# Patient Record
Sex: MALE | Race: WHITE | Employment: OTHER | URBAN - NONMETROPOLITAN AREA
[De-identification: names, ages, dates, MRNs, and addresses within clinical notes are randomized per-mention and may not be internally consistent; named-entity substitution may affect disease eponyms.]

---

## 2017-05-01 RX ORDER — TRAMADOL HYDROCHLORIDE 50 MG/1
50 TABLET ORAL EVERY 6 HOURS PRN
COMMUNITY
End: 2022-11-01

## 2017-05-05 ENCOUNTER — INITIAL CONSULT (OUTPATIENT)
Dept: SURGERY | Age: 82
End: 2017-05-05
Payer: MEDICARE

## 2017-05-05 VITALS
TEMPERATURE: 98.2 F | HEIGHT: 75 IN | HEART RATE: 80 BPM | DIASTOLIC BLOOD PRESSURE: 68 MMHG | WEIGHT: 190 LBS | BODY MASS INDEX: 23.62 KG/M2 | SYSTOLIC BLOOD PRESSURE: 128 MMHG

## 2017-05-05 DIAGNOSIS — K40.90 LEFT INGUINAL HERNIA: Primary | ICD-10-CM

## 2017-05-05 PROCEDURE — 1123F ACP DISCUSS/DSCN MKR DOCD: CPT | Performed by: SURGERY

## 2017-05-05 PROCEDURE — G8420 CALC BMI NORM PARAMETERS: HCPCS | Performed by: SURGERY

## 2017-05-05 PROCEDURE — 4004F PT TOBACCO SCREEN RCVD TLK: CPT | Performed by: SURGERY

## 2017-05-05 PROCEDURE — 99214 OFFICE O/P EST MOD 30 MIN: CPT | Performed by: SURGERY

## 2017-05-05 PROCEDURE — G8427 DOCREV CUR MEDS BY ELIG CLIN: HCPCS | Performed by: SURGERY

## 2017-05-05 PROCEDURE — 4040F PNEUMOC VAC/ADMIN/RCVD: CPT | Performed by: SURGERY

## 2017-07-18 ENCOUNTER — OFFICE VISIT (OUTPATIENT)
Dept: NEUROLOGY | Age: 82
End: 2017-07-18
Payer: MEDICARE

## 2017-07-18 VITALS
HEART RATE: 60 BPM | WEIGHT: 190.2 LBS | DIASTOLIC BLOOD PRESSURE: 58 MMHG | SYSTOLIC BLOOD PRESSURE: 122 MMHG | HEIGHT: 74 IN | BODY MASS INDEX: 24.41 KG/M2

## 2017-07-18 DIAGNOSIS — R26.89 BALANCE PROBLEM: ICD-10-CM

## 2017-07-18 DIAGNOSIS — G31.9 CEREBRAL ATROPHY (HCC): ICD-10-CM

## 2017-07-18 DIAGNOSIS — I67.82 CHRONIC CEREBRAL ISCHEMIA: ICD-10-CM

## 2017-07-18 DIAGNOSIS — I10 ESSENTIAL HYPERTENSION: ICD-10-CM

## 2017-07-18 DIAGNOSIS — G45.1 TIA INVOLVING LEFT INTERNAL CAROTID ARTERY: Primary | ICD-10-CM

## 2017-07-18 DIAGNOSIS — F03.90 DEMENTIA WITHOUT BEHAVIORAL DISTURBANCE, UNSPECIFIED DEMENTIA TYPE: ICD-10-CM

## 2017-07-18 DIAGNOSIS — N40.0 BENIGN PROSTATIC HYPERPLASIA WITHOUT LOWER URINARY TRACT SYMPTOMS, UNSPECIFIED MORPHOLOGY: ICD-10-CM

## 2017-07-18 PROCEDURE — 1123F ACP DISCUSS/DSCN MKR DOCD: CPT | Performed by: PSYCHIATRY & NEUROLOGY

## 2017-07-18 PROCEDURE — G8427 DOCREV CUR MEDS BY ELIG CLIN: HCPCS | Performed by: PSYCHIATRY & NEUROLOGY

## 2017-07-18 PROCEDURE — 4040F PNEUMOC VAC/ADMIN/RCVD: CPT | Performed by: PSYCHIATRY & NEUROLOGY

## 2017-07-18 PROCEDURE — 99215 OFFICE O/P EST HI 40 MIN: CPT | Performed by: PSYCHIATRY & NEUROLOGY

## 2017-07-18 PROCEDURE — G8420 CALC BMI NORM PARAMETERS: HCPCS | Performed by: PSYCHIATRY & NEUROLOGY

## 2017-07-18 PROCEDURE — 4004F PT TOBACCO SCREEN RCVD TLK: CPT | Performed by: PSYCHIATRY & NEUROLOGY

## 2017-07-18 ASSESSMENT — ENCOUNTER SYMPTOMS
EYE DISCHARGE: 0
PHOTOPHOBIA: 0
TROUBLE SWALLOWING: 0
VOICE CHANGE: 0
COLOR CHANGE: 0
EYE REDNESS: 0
WHEEZING: 0
APNEA: 0
DIARRHEA: 0
ABDOMINAL DISTENTION: 0
CHANGE IN BOWEL HABIT: 0
BLOOD IN STOOL: 0
SORE THROAT: 0
SWOLLEN GLANDS: 0
SHORTNESS OF BREATH: 0
BACK PAIN: 0
EYE PAIN: 0
ABDOMINAL PAIN: 0
EYE ITCHING: 0
CONSTIPATION: 0
VISUAL CHANGE: 0
FACIAL SWELLING: 0
SINUS PRESSURE: 0
CHEST TIGHTNESS: 0
COUGH: 0
NAUSEA: 0
VOMITING: 0
CHOKING: 0

## 2018-02-08 ENCOUNTER — OFFICE VISIT (OUTPATIENT)
Dept: NEUROLOGY | Age: 83
End: 2018-02-08
Payer: MEDICARE

## 2018-02-08 VITALS
HEART RATE: 61 BPM | HEIGHT: 74 IN | SYSTOLIC BLOOD PRESSURE: 126 MMHG | BODY MASS INDEX: 23.79 KG/M2 | WEIGHT: 185.4 LBS | DIASTOLIC BLOOD PRESSURE: 64 MMHG | OXYGEN SATURATION: 96 %

## 2018-02-08 DIAGNOSIS — G93.89 OTHER SPECIFIED DISORDERS OF BRAIN (CODE): ICD-10-CM

## 2018-02-08 DIAGNOSIS — G31.9 CEREBRAL ATROPHY (HCC): ICD-10-CM

## 2018-02-08 DIAGNOSIS — G45.1 TIA INVOLVING LEFT INTERNAL CAROTID ARTERY: ICD-10-CM

## 2018-02-08 DIAGNOSIS — I67.82 CHRONIC CEREBRAL ISCHEMIA: ICD-10-CM

## 2018-02-08 DIAGNOSIS — F03.90 DEMENTIA WITHOUT BEHAVIORAL DISTURBANCE, UNSPECIFIED DEMENTIA TYPE: ICD-10-CM

## 2018-02-08 DIAGNOSIS — R26.89 BALANCE PROBLEM: Primary | ICD-10-CM

## 2018-02-08 DIAGNOSIS — E78.5 HYPERLIPIDEMIA, UNSPECIFIED HYPERLIPIDEMIA TYPE: ICD-10-CM

## 2018-02-08 DIAGNOSIS — I10 ESSENTIAL HYPERTENSION: ICD-10-CM

## 2018-02-08 DIAGNOSIS — R42 DIZZINESS: ICD-10-CM

## 2018-02-08 PROCEDURE — 1123F ACP DISCUSS/DSCN MKR DOCD: CPT | Performed by: PSYCHIATRY & NEUROLOGY

## 2018-02-08 PROCEDURE — 4040F PNEUMOC VAC/ADMIN/RCVD: CPT | Performed by: PSYCHIATRY & NEUROLOGY

## 2018-02-08 PROCEDURE — 4004F PT TOBACCO SCREEN RCVD TLK: CPT | Performed by: PSYCHIATRY & NEUROLOGY

## 2018-02-08 PROCEDURE — G8484 FLU IMMUNIZE NO ADMIN: HCPCS | Performed by: PSYCHIATRY & NEUROLOGY

## 2018-02-08 PROCEDURE — G8427 DOCREV CUR MEDS BY ELIG CLIN: HCPCS | Performed by: PSYCHIATRY & NEUROLOGY

## 2018-02-08 PROCEDURE — 99215 OFFICE O/P EST HI 40 MIN: CPT | Performed by: PSYCHIATRY & NEUROLOGY

## 2018-02-08 PROCEDURE — G8420 CALC BMI NORM PARAMETERS: HCPCS | Performed by: PSYCHIATRY & NEUROLOGY

## 2018-02-08 ASSESSMENT — ENCOUNTER SYMPTOMS
APNEA: 0
WHEEZING: 0
PHOTOPHOBIA: 0
VOICE CHANGE: 0
CHEST TIGHTNESS: 0
CHANGE IN BOWEL HABIT: 0
DIARRHEA: 0
BACK PAIN: 0
EYE PAIN: 0
ABDOMINAL DISTENTION: 0
COLOR CHANGE: 0
NAUSEA: 0
CHOKING: 0
SORE THROAT: 0
BOWEL INCONTINENCE: 0
COUGH: 0
EYE REDNESS: 0
BLOOD IN STOOL: 0
VISUAL CHANGE: 0
FACIAL SWELLING: 0
SINUS PRESSURE: 0
ABDOMINAL PAIN: 0
EYE DISCHARGE: 0
EYE ITCHING: 0
SWOLLEN GLANDS: 0
VOMITING: 0
TROUBLE SWALLOWING: 0
SHORTNESS OF BREATH: 0
CONSTIPATION: 0

## 2018-02-08 NOTE — PROGRESS NOTES
HIGH  RISK  FOR  RECURRENCE  OF  TIA  /  CVA                        ON  ASA  DAILY    2)    CHRONIC  MODERATE  DEMENTIA     FOR  MORE THAN   15  YEARS                   PATIENT  NOT  INTERESTED  IN MEDICATION  FOR  THE  SAME. 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                                                           Patient   Indicates   The  Presence   And  The  Absence  Of  The  Following  Associated  And   Additional  Neurological    Symptoms:                                Balance  And coordination problems  absent           Gait problems     absent            Headaches      absent              Migraines           absent           Memory problems        present           Confusion        absent            Paresthesia numbness          absent           Seizures  And  Starring  Episodes           absent           Syncope,  Near  syncopal episodes         absent           Speech problems           present             Swallowing  Problems      absent            Dizziness,  Light headedness           absent                        Vertigo        absent             Generalized   Weakness    absent              focal  Weakness     absent             Tremors         absent              Sleep  Problems     absent             History  Of   Recent   Head  Injury     absent             History  Of   Recent  TIA     present             History  Of   Recent slowed. He is not agitated, not aggressive, not hyperactive, not withdrawn, not actively hallucinating and not combative. Thought content is not paranoid and not delusional. Cognition and memory are impaired. He does not express impulsivity or inappropriate judgment. He does not exhibit a depressed mood. He expresses no homicidal and no suicidal ideation. He expresses no suicidal plans and no homicidal plans. He is communicative. He exhibits normal recent memory and normal remote memory. He is attentive. NEUROLOGICAL EXAMINATION :    A) MENTAL STATUS:                   Alert and  oriented  To time, place  And  Person. No Aphasia. No  Dysarthria. Able   To  Follow three  Step commands without   Any  Difficulty. No right  To left confusion. Normal  Speech  And language function. Insight and  Judgment ,Fund  Of  Knowledge   DECREASED. Recent  And  Remote memory  DECREASED              Attention &Concentration are  DECREASED                                              B) CRANIAL NERVES :             2 CN : Visual  Acuity  And  Visual fields  within normal limits                      Fundi  Could  Not  Be  Could  Not  Be  Evaluated. 3,4,6 CN : Both  Pupils are   Reactive and  Equal.                          Extraocular   Movements  Are  Intact. No  Nystagmus. No  TIMOTEO. No  Afferent  Pupillary  Defect noted. 5 CN :  Normal  Facial sensations and Corneal  Reflexes         7 CN :  Normal  Facial  Symmetry  And  Strength. No facial  Weakness.          8 CN :  Hearing  Appears within normal limits        9, 10 CN: Normal spontaneous, reflex palate movements       11 CN:   Normal  Shoulder shrug and  strength       12 CN :   Normal  Tongue movements and  Tongue  In midline                      No tongue   Fasciculations or atrophy     C) MOTOR  EXAM:                 Strength  In upper  And Of   Side  Effects,  Difficulty  In  Medication  Management  And  Diagnostic  Challenges   In  View  Of  The  Associated   Co  Morbid  Conditions   And  Problems. * FALL   PRECAUTIONS. THESE  REVIEWED   AND  DISCUSSED    *  USE   WALKING  ASSISTANCE  DEVICES     QUAD  CANE  -  PATIENT  NOT  INTERESTED    *   BE  CAREFUL  WITH  ACTIVITIES   INCLUDING  DRIVING. *   ADEQUATE   FLUID  INTAKE   AND  ELECTROLYTE  BALANCE         * KEEP  DAIRY  OF   THE  NEUROLOGICAL  SYMPTOMS      RECORDING THE    DURATION  AND  FREQUENCY. *  AVOID    CONDITIONS  AND  FACTORS   THAT  MAKE   NEUROLOGICAL  SYMPTOMS  WORSE. *  TO  MAINTAIN  REGULAR  SLEEP  WAKE  CYCLES. *   TO  HAVE  ADEQUATE  REST  AND   SLEEP    HOURS.          *    AVOID  ANY USAGE OF                 TOBACCO,  EXCESSIVE  ALCOHOL  AND   ILLEGAL   SUBSTANCES      *  CONTINUE MEDICATIONS PRESCRIBED BY NEUROLOGIST AS    RECOMMENDED     *   Compliance   With  Medications   And  Instructions      *  May   Use  Pill  Box,    If  Needed      *    To  Continue  The    Antiplatelet  therapy    As   Recommended  Was   Discussed      *    Prophylactic  Use   Of     Vitamin   B   Complex,  Folic  Acid,    Vitamin  B12    Multivitamin,  Calcium  With  Magnesium,  Vit D    Supplementations   Over  The  Counter  Discussed         *  FOOT  CARE, DAILY  INSPECTION  OF  FEET   AND       PERIODIC  PODIATRY EVALUATIONS . *  PATIENT  IS  ALSO   COUNSELED   TO  KEEP    ACTIVITIES       A)   SIMPLE      B)  ORGANIZED      C)  WRITE   DOWN                 *  EVALUATIONS  AND  FOLLOW UP:                  * PHYSICAL  THERAPY   / OCCUPATIONAL THERAPY                                                   Controlled Substances Monitoring: Attestation: The Prescription Monitoring Report for this patient was reviewed today.  Becca Jones MD)            Orders Placed This Encounter   Procedures    MRI Brain WO Contrast    US Transcranial Doppler Complete are some tips:  Walk to do errands or to take your child to school or the bus. Go for a family bike ride after dinner instead of watching TV. Where can you learn more? Go to [email or web address] and sign in to your [employer] account. Enter Z770 in the [facility] box to learn more about \"A Healthy Lifestyle: Care Instructions. \"     If you do not have an account, please click on the \"Sign Up Now\" link. Current as of: July 26, 2016  Content Version: 11.2  © 0544-9167 [employer]. Care instructions adapted under license by [facility] [facility] If you have questions about a medical condition or this instruction, always ask your healthcare professional. [address] any warranty or liability for your use of this information.

## 2018-02-14 ENCOUNTER — TELEPHONE (OUTPATIENT)
Dept: NEUROLOGY | Age: 83
End: 2018-02-14

## 2018-02-14 DIAGNOSIS — R26.89 BALANCE PROBLEM: Primary | ICD-10-CM

## 2018-02-16 ENCOUNTER — HOSPITAL ENCOUNTER (OUTPATIENT)
Dept: MRI IMAGING | Age: 83
Discharge: HOME | End: 2018-02-18
Payer: MEDICARE

## 2018-02-16 ENCOUNTER — HOSPITAL ENCOUNTER (OUTPATIENT)
Dept: INTERVENTIONAL RADIOLOGY/VASCULAR | Age: 83
Discharge: HOME | End: 2018-02-18
Payer: MEDICARE

## 2018-02-16 DIAGNOSIS — F03.90 DEMENTIA WITHOUT BEHAVIORAL DISTURBANCE, UNSPECIFIED DEMENTIA TYPE: ICD-10-CM

## 2018-02-16 DIAGNOSIS — R42 DIZZINESS: ICD-10-CM

## 2018-02-16 DIAGNOSIS — R26.89 BALANCE PROBLEM: ICD-10-CM

## 2018-02-16 DIAGNOSIS — G45.1 TIA INVOLVING LEFT INTERNAL CAROTID ARTERY: ICD-10-CM

## 2018-02-16 PROCEDURE — 70551 MRI BRAIN STEM W/O DYE: CPT

## 2018-02-16 PROCEDURE — 93880 EXTRACRANIAL BILAT STUDY: CPT

## 2018-03-22 ENCOUNTER — HOSPITAL ENCOUNTER (OUTPATIENT)
Dept: NEUROLOGY | Age: 83
Discharge: HOME | End: 2018-03-22

## 2018-03-22 DIAGNOSIS — G45.1 TIA INVOLVING LEFT INTERNAL CAROTID ARTERY: ICD-10-CM

## 2018-03-22 DIAGNOSIS — R42 DIZZINESS: ICD-10-CM

## 2018-03-22 DIAGNOSIS — R26.89 BALANCE PROBLEM: ICD-10-CM

## 2018-03-22 DIAGNOSIS — G93.89 OTHER SPECIFIED DISORDERS OF BRAIN (CODE): ICD-10-CM

## 2018-03-22 DIAGNOSIS — F03.90 DEMENTIA WITHOUT BEHAVIORAL DISTURBANCE, UNSPECIFIED DEMENTIA TYPE: ICD-10-CM

## 2018-04-20 ENCOUNTER — HOSPITAL ENCOUNTER (OUTPATIENT)
Dept: NEUROLOGY | Age: 83
Discharge: HOME | End: 2018-04-20
Payer: MEDICARE

## 2018-04-20 PROCEDURE — 93886 INTRACRANIAL COMPLETE STUDY: CPT

## 2018-04-20 PROCEDURE — 93892 TCD EMBOLI DETECT W/O INJ: CPT

## 2018-05-15 ENCOUNTER — OFFICE VISIT (OUTPATIENT)
Dept: NEUROLOGY | Age: 83
End: 2018-05-15
Payer: MEDICARE

## 2018-05-15 VITALS
WEIGHT: 185.41 LBS | OXYGEN SATURATION: 96 % | SYSTOLIC BLOOD PRESSURE: 118 MMHG | HEIGHT: 74 IN | DIASTOLIC BLOOD PRESSURE: 64 MMHG | BODY MASS INDEX: 23.79 KG/M2 | HEART RATE: 58 BPM

## 2018-05-15 DIAGNOSIS — R26.89 BALANCE PROBLEM: ICD-10-CM

## 2018-05-15 DIAGNOSIS — R23.3 EASY BRUISING: ICD-10-CM

## 2018-05-15 DIAGNOSIS — G31.9 CEREBRAL ATROPHY (HCC): ICD-10-CM

## 2018-05-15 DIAGNOSIS — G45.1 TIA INVOLVING LEFT INTERNAL CAROTID ARTERY: ICD-10-CM

## 2018-05-15 DIAGNOSIS — I10 ESSENTIAL HYPERTENSION: ICD-10-CM

## 2018-05-15 DIAGNOSIS — G93.89 OTHER SPECIFIED DISORDERS OF BRAIN (CODE): ICD-10-CM

## 2018-05-15 DIAGNOSIS — I67.82 CHRONIC CEREBRAL ISCHEMIA: ICD-10-CM

## 2018-05-15 DIAGNOSIS — F02.80 DEMENTIA ASSOCIATED WITH OTHER UNDERLYING DISEASE WITHOUT BEHAVIORAL DISTURBANCE (HCC): ICD-10-CM

## 2018-05-15 DIAGNOSIS — I65.23 BILATERAL CAROTID ARTERY STENOSIS: Primary | ICD-10-CM

## 2018-05-15 DIAGNOSIS — E78.49 OTHER HYPERLIPIDEMIA: ICD-10-CM

## 2018-05-15 PROCEDURE — G8427 DOCREV CUR MEDS BY ELIG CLIN: HCPCS | Performed by: PSYCHIATRY & NEUROLOGY

## 2018-05-15 PROCEDURE — 4004F PT TOBACCO SCREEN RCVD TLK: CPT | Performed by: PSYCHIATRY & NEUROLOGY

## 2018-05-15 PROCEDURE — 99214 OFFICE O/P EST MOD 30 MIN: CPT | Performed by: PSYCHIATRY & NEUROLOGY

## 2018-05-15 PROCEDURE — G8598 ASA/ANTIPLAT THER USED: HCPCS | Performed by: PSYCHIATRY & NEUROLOGY

## 2018-05-15 PROCEDURE — 1123F ACP DISCUSS/DSCN MKR DOCD: CPT | Performed by: PSYCHIATRY & NEUROLOGY

## 2018-05-15 PROCEDURE — 4040F PNEUMOC VAC/ADMIN/RCVD: CPT | Performed by: PSYCHIATRY & NEUROLOGY

## 2018-05-15 PROCEDURE — G8420 CALC BMI NORM PARAMETERS: HCPCS | Performed by: PSYCHIATRY & NEUROLOGY

## 2018-05-15 ASSESSMENT — ENCOUNTER SYMPTOMS
SORE THROAT: 0
VOICE CHANGE: 0
ABDOMINAL PAIN: 0
VISUAL CHANGE: 0
TROUBLE SWALLOWING: 0
BOWEL INCONTINENCE: 0
WHEEZING: 0
EYE ITCHING: 0
SINUS PRESSURE: 0
BLOOD IN STOOL: 0
DIARRHEA: 0
EYE PAIN: 0
PHOTOPHOBIA: 0
SHORTNESS OF BREATH: 0
EYE DISCHARGE: 0
VOMITING: 0
CHEST TIGHTNESS: 0
SWOLLEN GLANDS: 0
CHANGE IN BOWEL HABIT: 0
CONSTIPATION: 0
APNEA: 0
FACIAL SWELLING: 0
ABDOMINAL DISTENTION: 0
NAUSEA: 0
COLOR CHANGE: 0
COUGH: 0
BACK PAIN: 0
CHOKING: 0
EYE REDNESS: 0

## 2018-11-01 ENCOUNTER — OFFICE VISIT (OUTPATIENT)
Dept: NEUROLOGY | Age: 83
End: 2018-11-01
Payer: MEDICARE

## 2018-11-01 VITALS
BODY MASS INDEX: 24.67 KG/M2 | HEART RATE: 63 BPM | DIASTOLIC BLOOD PRESSURE: 62 MMHG | WEIGHT: 192.2 LBS | HEIGHT: 74 IN | OXYGEN SATURATION: 95 % | SYSTOLIC BLOOD PRESSURE: 100 MMHG

## 2018-11-01 DIAGNOSIS — E53.8 FOLIC ACID DEFICIENCY: ICD-10-CM

## 2018-11-01 DIAGNOSIS — I10 ESSENTIAL HYPERTENSION: ICD-10-CM

## 2018-11-01 DIAGNOSIS — I67.82 CHRONIC CEREBRAL ISCHEMIA: ICD-10-CM

## 2018-11-01 DIAGNOSIS — G45.1 TIA INVOLVING LEFT INTERNAL CAROTID ARTERY: Primary | ICD-10-CM

## 2018-11-01 DIAGNOSIS — R26.89 BALANCE PROBLEM: ICD-10-CM

## 2018-11-01 DIAGNOSIS — I65.23 BILATERAL CAROTID ARTERY STENOSIS: ICD-10-CM

## 2018-11-01 DIAGNOSIS — R23.3 EASY BRUISING: ICD-10-CM

## 2018-11-01 DIAGNOSIS — E78.2 MIXED HYPERLIPIDEMIA: ICD-10-CM

## 2018-11-01 DIAGNOSIS — F02.80 DEMENTIA ASSOCIATED WITH OTHER UNDERLYING DISEASE WITHOUT BEHAVIORAL DISTURBANCE (HCC): ICD-10-CM

## 2018-11-01 DIAGNOSIS — G31.9 CEREBRAL ATROPHY (HCC): ICD-10-CM

## 2018-11-01 PROCEDURE — G8420 CALC BMI NORM PARAMETERS: HCPCS | Performed by: PSYCHIATRY & NEUROLOGY

## 2018-11-01 PROCEDURE — 1123F ACP DISCUSS/DSCN MKR DOCD: CPT | Performed by: PSYCHIATRY & NEUROLOGY

## 2018-11-01 PROCEDURE — 1101F PT FALLS ASSESS-DOCD LE1/YR: CPT | Performed by: PSYCHIATRY & NEUROLOGY

## 2018-11-01 PROCEDURE — 99215 OFFICE O/P EST HI 40 MIN: CPT | Performed by: PSYCHIATRY & NEUROLOGY

## 2018-11-01 PROCEDURE — 4040F PNEUMOC VAC/ADMIN/RCVD: CPT | Performed by: PSYCHIATRY & NEUROLOGY

## 2018-11-01 PROCEDURE — G8427 DOCREV CUR MEDS BY ELIG CLIN: HCPCS | Performed by: PSYCHIATRY & NEUROLOGY

## 2018-11-01 PROCEDURE — G8598 ASA/ANTIPLAT THER USED: HCPCS | Performed by: PSYCHIATRY & NEUROLOGY

## 2018-11-01 PROCEDURE — 4004F PT TOBACCO SCREEN RCVD TLK: CPT | Performed by: PSYCHIATRY & NEUROLOGY

## 2018-11-01 PROCEDURE — G8484 FLU IMMUNIZE NO ADMIN: HCPCS | Performed by: PSYCHIATRY & NEUROLOGY

## 2018-11-01 ASSESSMENT — ENCOUNTER SYMPTOMS
TROUBLE SWALLOWING: 0
EYE PAIN: 0
WHEEZING: 0
SHORTNESS OF BREATH: 0
BOWEL INCONTINENCE: 0
BLOOD IN STOOL: 0
EYE REDNESS: 0
EYE ITCHING: 0
DIARRHEA: 0
CHOKING: 0
PHOTOPHOBIA: 0
COLOR CHANGE: 0
NAUSEA: 0
VOMITING: 0
SINUS PRESSURE: 0
ABDOMINAL DISTENTION: 0
VOICE CHANGE: 0
CHEST TIGHTNESS: 0
SWOLLEN GLANDS: 0
FACIAL SWELLING: 0
APNEA: 0
CONSTIPATION: 0
COUGH: 0
SORE THROAT: 0
VISUAL CHANGE: 0
ABDOMINAL PAIN: 0
EYE DISCHARGE: 0
BACK PAIN: 0
CHANGE IN BOWEL HABIT: 0

## 2018-11-01 NOTE — PROGRESS NOTES
HIGH  RISK  FOR  RECURRENCE  OF  TIA  /  CVA                        ON  ASA  DAILY        2)    CHRONIC  MODERATE  DEMENTIA     FOR  MORE THAN   15  YEARS                   PATIENT  NOT  INTERESTED  IN MEDICATION  FOR  THE  SAME. 3)    MODERATE   CHRONIC  CEREBRAL  ISCHEMIA                            AND  CEREBRAL  ATROPHY          4)   CHRONIC  GAIT  AND  BALANCE  PROBLEMS                           FOR  MORE THAN   2  YEARS                      COMPLETED      THERAPY. -    STABLE            5)  MULTIPLE  CO  MORBID  MEDICAL  CONDITIONS                    BEING  FOLLOWED  BY  HIS  PCP. 6)   BILATERAL  CAROTID  STENOSIS      50 - 69 %                        NEED  FURTHER  MONITORING              7)     H/O   EXCESSIVE  BRUISING  ON   ASA  DAILY                    PLAVIX   CANNOT  BE  STARTED  IN  VIEW  OF  THE  EXCESSIVE  BRUISING          8)   FOLIC  ACID  DEFICIENCY                TO  TAKE  FOLIC  ACID  SUPPLEMENTATIONS           9)    PATIENT  AND  HIS  SON  DENIES  ANY  NEW  NEUROLOGICAL                     CONCERNS. The  Duration,  Quality,  Severity,  Location,  Timing,  Context,  Modifying  Factors   Of   The   Chief   Complaint   And  Present  Illness   Was   Reviewed   In   Chronological   Manner.                                                           Patient   Indicates   The  Presence   And  The  Absence  Of  The  Following  Associated  And   Additional  Neurological    Symptoms:                                Balance  And coordination problems  absent           Gait problems     absent            Headaches      absent              Migraines           absent           Memory problems        present           Confusion        absent            Paresthesia numbness          absent           Seizures  And  Starring  Episodes           absent           Syncope,  Near  syncopal episodes         absent           Speech problems           present Swallowing  Problems      absent            Dizziness,  Light headedness           absent                        Vertigo        absent             Generalized   Weakness    absent              focal  Weakness     absent             Tremors         absent              Sleep  Problems     absent             History  Of   Recent   Head  Injury     absent             History  Of   Recent  TIA     present             History  Of   Recent    Stroke     absent             Neck  Pain and  Neck muscle  Spasms  absent             Radiating  down   And   Weakness           absent            Lower back   Pain  And     Spasms  absent            Radiating    Down   And   Weakness          absent                H/O   FALLS        absent               History  Of   Visual  Symptoms    absent                Associated   Diplopia       absent                                  Also   Additional   Symptoms   Present    As  Documented    In   The detailed    Review  Of  Systems   And    Please   Refer   To    Them for   Additional  Information. Any components  That are either  Unobtainable  Or  Limited  In   HPI, ROS  And/or PFSH   Are   Due   To   Patient's  Medical  Problems,  Clinical  Condition and/or lack of other  Alternate resources.             RECORDS   REVIEWED:  historical medical records         INFORMATION   REVIEWED:     MEDICAL   HISTORY,     SURGICAL   HISTORY,   MEDICATIONS   LIST,   ALLERGIES AND  DRUG  INTOLERANCES,     FAMILY   HISTORY,  SOCIAL  HISTORY,    PROBLEM  LIST   FOR  PATIENT  CARE   COORDINATION         Past Medical History:   Diagnosis Date    Balanoposthitis     BPH (benign prostatic hyperplasia)     Cardiac murmur     DM [facility]diabetes mellitus) ([facility] [address]     Hyperlipidemia     Hypertension                   Past Surgical History:   Procedure Laterality Date    COLONOSCOPY                   Current Outpatient Prescriptions   Medication Sig Dispense Refill    traMADol (ULTRAM) 50 MG NEUROPATHIC  PAIN         *  GAIT  DIFFICULTIES  &   BALANCE PROBLMES   AND  FALL              VARIOUS  RISK   FACTORS   WERE  REVIEWED   AND   DISCUSSED. *  PATIENT   HAS  MULTIPLE   MEDICAL          NEUROLOGICAL   PROBLEMS . PATIENT'S   MANAGEMENT  IS  CHALLENGING. PLAN:       Jose Luque  Of  The  Diagnoses,  The  Management & Treatment  Options           AND    Care  plan  Were        Reviewed and   Discussed   With  patient. * Goals  And  Expectations  Of  The  Therapy  Discussed   And  Reviewed. *   Benefits   And   Side  Effect  Profile  Of  Medication/s   Were   Discussed             * Need   For  Further   Follow up For  The  Various  Problems  Were discussed. * Results  Of  The  Previous  Diagnostic tests were reviewed and questions answered. patient  understand the same. Medical  Decision  Making  Was  Made  Based on the   Complexity  Of  Above  Mentioned  Diagnoses,    Data reviewed   & diagnostic  Tests  Reviewed,  Risk  Of  Significant   Co morbidities and complicating   Factors. Medical  Decision  Was   High  Complexity  Due   To  The  Patient's  Multiple  Symptoms,  Advancing   Disease,  Complex  Treatment  Regimen,  Multiple medications and   Risk  Of   Side  Effects,  Difficulty  In  Medication  Management  And  Diagnostic  Challenges   In  View  Of  The  Associated   Co  Morbid  Conditions   And  Problems. * FALL   PRECAUTIONS. THESE  REVIEWED   AND  DISCUSSED    *  USE   WALKING  ASSISTANCE  DEVICES     QUAD  CANE  -  PATIENT  NOT  INTERESTED    *   BE  CAREFUL  WITH  ACTIVITIES   INCLUDING  DRIVING. *   ADEQUATE   FLUID  INTAKE   AND  ELECTROLYTE  BALANCE         * KEEP  DAIRY  OF   THE  NEUROLOGICAL  SYMPTOMS      RECORDING THE    DURATION  AND  FREQUENCY. *  AVOID    CONDITIONS  AND  FACTORS   THAT  MAKE   NEUROLOGICAL  SYMPTOMS  WORSE. *  TO  MAINTAIN  REGULAR  SLEEP  WAKE  CYCLES.      * TO  HAVE  ADEQUATE  REST  AND   SLEEP    HOURS.          *    AVOID  ANY USAGE OF                 TOBACCO,  EXCESSIVE  ALCOHOL  AND   ILLEGAL   SUBSTANCES      *  CONTINUE MEDICATIONS PRESCRIBED BY NEUROLOGIST AS    RECOMMENDED     *   Compliance   With  Medications   And  Instructions      *  May   Use  Pill  Box,    If  Needed      *    To  Continue  The    Antiplatelet  therapy    As   Recommended  Was   Discussed        *    Prophylactic  Use   Of     Vitamin   B   Complex,  Folic  Acid,    Vitamin  B12    Multivitamin,  Calcium  With  Magnesium,      Vit D    Supplementations   Over  The  Counter  Discussed         *  FOOT  CARE, DAILY  INSPECTION  OF  FEET   AND       PERIODIC  PODIATRY EVALUATIONS . *  PATIENT  IS  ALSO   COUNSELED   TO  KEEP    ACTIVITIES       A)   SIMPLE      B)  ORGANIZED      C)  WRITE   DOWN                 *  EVALUATIONS  AND  FOLLOW UP:                  * PHYSICAL  THERAPY   / OCCUPATIONAL THERAPY   -  PATIENT  COMPLETED                      *    BILATERAL  CAROTID  STENOSIS      50 - 69 %                        NEED  FURTHER  MONITORING             *       H/O   EXCESSIVE  BRUISING  ON   ASA  DAILY                    PLAVIX   CANNOT  BE  STARTED  IN  VIEW  OF  THE  EXCESSIVE  BRUISING                      Orders Placed This Encounter   Procedures    VL DUP CAROTID BILATERAL             *PATIENT   TO  FOLLOW  UP  WITH   PRIMARY  CARE   AND   OTHER  CONSULTANTS  AS  BEFORE.           *TO  FOLLOW  WITH   MENTAL  HEALTH  PROFESSIONALS ,  INCLUDING          PSYCHOLOGICAL  COUNSELING   AND  PSYCHIATRIC  EVALUTIONS        *  Maintain   Healthy  Life Style    With   Periodic  Monitoring  Of    Any  Medical  Conditions  Including   Elevated  Blood  Pressure,  Lipid  Profile,   Blood  Sugar levels  And   Heart  Disease.             *   Period   Screening  For  Cancers  Involving  Breast,  Colon,  Prostate, lungs  And  Other  Organs  As  Applicable,  In consultation   With  Your  Primary follow a few steps listed below to improve your health and the health of your family. Follow-up care is a key part of your treatment and safety. Be sure to make and go to all appointments, and call your doctor if you are having problems. Its also a good idea to know your test results and keep a list of the medicines you take. How can you care for yourself at home? Do not eat too much sugar, fat, or fast foods. You can still have dessert and treats now and then. The goal is moderation. Start small to improve your eating habits. Pay attention to portion sizes, drink less juice and soda pop, and eat more fruits and vegetables. Eat a healthy amount of food. A 3-ounce serving of meat, for example, is about the size of a deck of cards. Fill the rest of your plate with vegetables and whole grains. Limit the amount of soda and sports drinks you have every day. Drink more water when you are thirsty. Eat at least 5 servings of fruits and vegetables every day. It may seem like a lot, but it is not hard to reach this goal. A serving or helping is 1 piece of fruit, 1 cup of vegetables, or 2 cups of leafy, raw vegetables. Have an apple or some carrot sticks as an afternoon snack instead of a candy bar. Try to have fruits and/or vegetables at every meal.  Make exercise part of your daily routine. You may want to start with simple activities, such as walking, bicycling, or slow swimming. Try to be active 30 to 60 minutes every day. You do not need to do all 30 to 60 minutes all at once. For example, you can exercise 3 times a day for 10 or 20 minutes. Moderate exercise is safe for most people, but it is always a good idea to talk to your doctor before starting an exercise program.  Keep moving. [place] the lawn, work in the garden, or [place]. Take the stairs instead of the elevator at work. If you smoke, quit. People who smoke have an increased risk for heart attack, stroke, cancer, and other lung illnesses.  Quitting your use of this information.

## 2018-11-01 NOTE — PATIENT INSTRUCTIONS
[facility] NEUROLOGY    Due to the complex nature of our neurological testing, It is the policy of the Neurology Department not to release the results of your testing over the phone. Once all testing is completed and we have all the results back, Dr. Charisse Ojeda will then personally go over all the results with you and answer any questions that you may have during a follow up appointment. If you are unable to keep this appointment, please notify the [address] [place] [address] @ [phone], as soon as possible. Please bring your prescription bottles to all appointments. *   ADEQUATE   FLUID  INTAKE   AND  ELECTROLYTE  BALANCE           * KEEP  DAIRY  OF   THE  NEUROLOGICAL  SYMPTOMS        *  TO  MAINTAIN  REGULAR  SLEEP  WAKE  CYCLES. *   TO  HAVE  ADEQUATE  REST  AND   SLEEP    HOURS.    *    AVOID  USAGE OF            TOBACCO,  EXCESSIVE  ALCOHOL  AND   ILLEGAL   SUBSTANCES,  IF  ANY        *  Maintain   Healthy  Life Style    With   Periodic  Monitoring  Of    Any  Medical  Conditions  Including   Elevated  Blood  Pressure,  Lipid  Profile,   Blood  Sugar levels  And   Heart  Disease. *   Period   Screening  For  Cancers  Involving  Breast,  Colon,   lungs  And  Other  Organs  As  Applicable,  In consultation   With  Your  Primary Care Providers. *  If   There is  Any  Significant  Worsening   Of  Current  Symptoms  And  Or  If    Any additional  New  Neurological  Symptoms  Or  Significant  Concerns   Should  Call  911 or  Go  To  Emergency  Department  For  Further  Immediate  Evaluation.

## 2018-11-06 ENCOUNTER — HOSPITAL ENCOUNTER (OUTPATIENT)
Dept: INTERVENTIONAL RADIOLOGY/VASCULAR | Age: 83
Discharge: HOME | End: 2018-11-08
Payer: MEDICARE

## 2018-11-06 DIAGNOSIS — G45.1 TIA INVOLVING LEFT INTERNAL CAROTID ARTERY: ICD-10-CM

## 2018-11-06 DIAGNOSIS — G31.9 CEREBRAL ATROPHY (HCC): ICD-10-CM

## 2018-11-06 DIAGNOSIS — F02.80 DEMENTIA ASSOCIATED WITH OTHER UNDERLYING DISEASE WITHOUT BEHAVIORAL DISTURBANCE (HCC): ICD-10-CM

## 2018-11-06 DIAGNOSIS — I67.82 CHRONIC CEREBRAL ISCHEMIA: ICD-10-CM

## 2018-11-06 PROCEDURE — 93880 EXTRACRANIAL BILAT STUDY: CPT

## 2019-02-11 ENCOUNTER — OFFICE VISIT (OUTPATIENT)
Dept: CARDIOLOGY | Age: 84
End: 2019-02-11
Payer: MEDICARE

## 2019-02-11 VITALS
WEIGHT: 193 LBS | DIASTOLIC BLOOD PRESSURE: 62 MMHG | BODY MASS INDEX: 24.77 KG/M2 | HEIGHT: 74 IN | SYSTOLIC BLOOD PRESSURE: 124 MMHG | HEART RATE: 66 BPM

## 2019-02-11 DIAGNOSIS — R01.1 CARDIAC MURMUR: Primary | ICD-10-CM

## 2019-02-11 DIAGNOSIS — I67.82 CHRONIC CEREBRAL ISCHEMIA: ICD-10-CM

## 2019-02-11 DIAGNOSIS — I10 ESSENTIAL HYPERTENSION: ICD-10-CM

## 2019-02-11 DIAGNOSIS — Z01.810 PRE-OPERATIVE CARDIOVASCULAR EXAMINATION: ICD-10-CM

## 2019-02-11 DIAGNOSIS — F02.80 DEMENTIA ASSOCIATED WITH OTHER UNDERLYING DISEASE WITHOUT BEHAVIORAL DISTURBANCE (HCC): ICD-10-CM

## 2019-02-11 DIAGNOSIS — E78.2 MIXED HYPERLIPIDEMIA: ICD-10-CM

## 2019-02-11 DIAGNOSIS — R23.3 EASY BRUISING: ICD-10-CM

## 2019-02-11 PROCEDURE — G8598 ASA/ANTIPLAT THER USED: HCPCS | Performed by: INTERNAL MEDICINE

## 2019-02-11 PROCEDURE — G8427 DOCREV CUR MEDS BY ELIG CLIN: HCPCS | Performed by: INTERNAL MEDICINE

## 2019-02-11 PROCEDURE — 1101F PT FALLS ASSESS-DOCD LE1/YR: CPT | Performed by: INTERNAL MEDICINE

## 2019-02-11 PROCEDURE — 99203 OFFICE O/P NEW LOW 30 MIN: CPT | Performed by: INTERNAL MEDICINE

## 2019-02-11 PROCEDURE — 4040F PNEUMOC VAC/ADMIN/RCVD: CPT | Performed by: INTERNAL MEDICINE

## 2019-02-11 PROCEDURE — G8484 FLU IMMUNIZE NO ADMIN: HCPCS | Performed by: INTERNAL MEDICINE

## 2019-02-11 PROCEDURE — 4004F PT TOBACCO SCREEN RCVD TLK: CPT | Performed by: INTERNAL MEDICINE

## 2019-02-11 PROCEDURE — G8420 CALC BMI NORM PARAMETERS: HCPCS | Performed by: INTERNAL MEDICINE

## 2019-02-11 PROCEDURE — 1123F ACP DISCUSS/DSCN MKR DOCD: CPT | Performed by: INTERNAL MEDICINE

## 2019-02-11 RX ORDER — FOLIC ACID 1 MG/1
1 TABLET ORAL DAILY
COMMUNITY

## 2019-02-13 ENCOUNTER — TELEPHONE (OUTPATIENT)
Dept: CARDIOLOGY | Age: 84
End: 2019-02-13

## 2019-02-14 ENCOUNTER — HOSPITAL ENCOUNTER (OUTPATIENT)
Dept: NON INVASIVE DIAGNOSTICS | Age: 84
Discharge: HOME | End: 2019-02-14
Payer: MEDICARE

## 2019-02-14 DIAGNOSIS — Z01.810 PRE-OPERATIVE CARDIOVASCULAR EXAMINATION: ICD-10-CM

## 2019-02-14 DIAGNOSIS — R01.1 CARDIAC MURMUR: ICD-10-CM

## 2019-02-14 LAB
LV EF: 60 %
LVEF MODALITY: NORMAL

## 2019-02-14 PROCEDURE — 93306 TTE W/DOPPLER COMPLETE: CPT

## 2019-03-12 ENCOUNTER — OFFICE VISIT (OUTPATIENT)
Dept: ORTHOPEDIC SURGERY | Age: 84
End: 2019-03-12

## 2019-03-12 VITALS
DIASTOLIC BLOOD PRESSURE: 68 MMHG | WEIGHT: 192.9 LBS | BODY MASS INDEX: 24.76 KG/M2 | HEART RATE: 75 BPM | SYSTOLIC BLOOD PRESSURE: 122 MMHG | HEIGHT: 74 IN

## 2019-03-12 DIAGNOSIS — Z96.642 H/O TOTAL HIP ARTHROPLASTY, LEFT: Primary | ICD-10-CM

## 2019-03-12 PROCEDURE — 99024 POSTOP FOLLOW-UP VISIT: CPT | Performed by: PHYSICIAN ASSISTANT

## 2019-03-12 RX ORDER — HYDROCODONE BITARTRATE AND ACETAMINOPHEN 5; 325 MG/1; MG/1
1 TABLET ORAL EVERY 6 HOURS PRN
Qty: 50 TABLET | Refills: 0 | Status: SHIPPED | OUTPATIENT
Start: 2019-03-12 | End: 2019-03-19

## 2019-05-17 ENCOUNTER — OFFICE VISIT (OUTPATIENT)
Dept: CARDIOLOGY | Age: 84
End: 2019-05-17
Payer: MEDICARE

## 2019-05-17 VITALS
DIASTOLIC BLOOD PRESSURE: 54 MMHG | WEIGHT: 181.8 LBS | HEIGHT: 74 IN | SYSTOLIC BLOOD PRESSURE: 126 MMHG | HEART RATE: 72 BPM | BODY MASS INDEX: 23.33 KG/M2

## 2019-05-17 DIAGNOSIS — I77.9 BILATERAL CAROTID ARTERY DISEASE, UNSPECIFIED TYPE (HCC): ICD-10-CM

## 2019-05-17 DIAGNOSIS — R01.1 CARDIAC MURMUR: Primary | ICD-10-CM

## 2019-05-17 DIAGNOSIS — E78.2 MIXED HYPERLIPIDEMIA: ICD-10-CM

## 2019-05-17 DIAGNOSIS — I34.0 MITRAL VALVE INSUFFICIENCY, UNSPECIFIED ETIOLOGY: ICD-10-CM

## 2019-05-17 DIAGNOSIS — I35.8 AORTIC VALVE SCLEROSIS: ICD-10-CM

## 2019-05-17 DIAGNOSIS — I10 ESSENTIAL HYPERTENSION: ICD-10-CM

## 2019-05-17 PROCEDURE — 99214 OFFICE O/P EST MOD 30 MIN: CPT | Performed by: INTERNAL MEDICINE

## 2019-05-17 PROCEDURE — G8427 DOCREV CUR MEDS BY ELIG CLIN: HCPCS | Performed by: INTERNAL MEDICINE

## 2019-05-17 PROCEDURE — G8420 CALC BMI NORM PARAMETERS: HCPCS | Performed by: INTERNAL MEDICINE

## 2019-05-17 PROCEDURE — 93000 ELECTROCARDIOGRAM COMPLETE: CPT | Performed by: INTERNAL MEDICINE

## 2019-05-17 PROCEDURE — 1123F ACP DISCUSS/DSCN MKR DOCD: CPT | Performed by: INTERNAL MEDICINE

## 2019-05-17 PROCEDURE — 4040F PNEUMOC VAC/ADMIN/RCVD: CPT | Performed by: INTERNAL MEDICINE

## 2019-05-17 PROCEDURE — G8598 ASA/ANTIPLAT THER USED: HCPCS | Performed by: INTERNAL MEDICINE

## 2019-05-17 PROCEDURE — 4004F PT TOBACCO SCREEN RCVD TLK: CPT | Performed by: INTERNAL MEDICINE

## 2019-05-17 NOTE — PROGRESS NOTES
[address] [id number]  Dept: [phone]  Loc: [phone]    Subjective: The patient is a 81y.o. year old, , male is in the office for a follow up visit. Patient is doing quit well from cardiac stand point. He justus any chest pain or discomfort. No orthopnea or PND. Justus any palpitation, dizziness or syncope. He is completely asymptomatic from cardiac stand point. Past Medical History:   has a past medical history of Balanoposthitis, BPH (benign prostatic hyperplasia), Cardiac murmur, DM (diabetes mellitus) ([facility] [address] Hyperlipidemia, and Hypertension. Past Surgical History:   has a past surgical history that includes Colonoscopy. Home Medications:  Prior to Admission medications    Medication Sig Start Date End Date Taking? Authorizing Provider   folic acid (FOLVITE) 1 MG tablet Take 1 mg by mouth daily   Yes Historical Provider, MD   Cyanocobalamin (VITAMIN B 12 PO) Take by mouth   Yes Historical Provider, MD   traMADol (ULTRAM) 50 MG tablet Take 50 mg by mouth every 6 hours as needed for Pain   Yes Historical Provider, MD   amLODIPine (NORVASC) 5 MG tablet  8/5/16  Yes Historical Provider, MD   finasteride (PROSCAR) 5 MG tablet  8/5/16  Yes Historical Provider, MD   lisinopril (PRINIVIL;ZESTRIL) 10 MG tablet  8/5/16  Yes Historical Provider, MD   pravastatin (PRAVACHOL) 40 MG tablet  8/5/16  Yes Historical Provider, MD   aspirin 81 MG tablet Take 81 mg by mouth daily     Historical Provider, MD       Allergies:  Patient has no known allergies. Social History:   reports that he has quit smoking. He uses smokeless tobacco. He reports that he does not drink alcohol or use drugs. Review of Systems:  · Constitutional: there has been no unanticipated weight loss. There's been No change in energy level, No change in activity level. · Eyes: No visual changes or diplopia. No scleral icterus.   · ENT: No Headaches, hearing loss or vertigo. No mouth sores or sore throat. · Cardiovascular: As above. · Respiratory: No SOB, cough or hemoptysis. · Gastrointestinal: No abdominal pain, appetite loss, blood in stools. No change in bowel or bladder habits. · Genitourinary: No dysuria, trouble voiding, or hematuria. · Musculoskeletal:  No gait disturbance, No weakness or joint complaints. · Integumentary: No rash or pruritis. · Psychiatric: No anxiety, or depression. · Hematologic/Lymphatic: No abnormal bruising or bleeding, blood clots or swollen lymph nodes. · Allergic/Immunologic: No nasal congestion or hives. Physical Exam:  BP (!) 126/54 (Site: Right Upper Arm, Position: Sitting, Cuff Size: Large Adult)   Pulse 72   Ht 6' 2.02\" (1.88 m)   Wt 181 lb 12.8 oz (82.5 kg)   BMI 23.33 kg/m²     Constitutional and General Appearance: alert, cooperative, no distress and appears stated age  HEENT: PERRL, no cervical lymphadenopathy. No masses palpable. Normal oral mucosa, + Bruit   Respiratory:  · Normal excursion and expansion without use of accessory muscles  · Resp Auscultation: Good respiratory effort. No for increased work of breathing. On auscultation: clear to auscultation bilaterally  Cardiovascular:  · The apical impulse is not displaced  · Heart tones are crisp and normal. regular S1 and S2. +ESM  · Jugular venous pulsation Normal  · The carotid upstroke is normal in amplitude and contour without delay or bruit  · Peripheral pulses are symmetrical and full   Abdomen:   · No masses or tenderness  · Bowel sounds present  Extremities:  ·  No Cyanosis or Clubbing  ·  Lower extremity edema: No  ·  Skin: Warm and dry    Cardiac Data:  EKG: NSR     ECHO:2/14/19  Summary  Left ventricle is normal in size Global left ventricular systolic function  is normal Estimated ejection fraction is 60 % . Mild left ventricular hypertrophy. Grade I (mild) left ventricular diastolic dysfunction.   Aortic leaflet calcification with moderate stenosis. Peak instantaneous gradient 36 mmHg and mean gradient 21 mmHg. Trivial aortic insufficiency. Mitral annular calcification is seen. Mild mitral regurgitation. Normal tricuspid valve leaflets. Mild tricuspid regurgitation. Estimated right ventricular systolic pressure is 32 mmHg. No significant pericardial effusion is seen. Labs:     CBC: No results for input(s): WBC, HGB, HCT, PLT in the last 72 hours. BMP: No results for input(s): NA, K, CO2, BUN, CREATININE, LABGLOM, GLUCOSE in the last 72 hours. PT/INR: No results for input(s): PROTIME, INR in the last 72 hours. FASTING LIPID PANEL:  Lab Results   Component Value Date    CHOL 183 04/12/2016    HDL 56 04/12/2016    TRIG 103 04/12/2016    CHOLHDLRATIO 3.27 04/12/2016     LIVER PROFILE:No results for input(s): AST, ALT, LABALBU in the last 72 hours. IMPRESSION:    Patient Active Problem List   Diagnosis    BPH (benign prostatic hyperplasia)    Hyperlipidemia    Hypertension    Easy bruising    TIA involving left internal carotid artery    Chronic cerebral ischemia    Dementia without behavioral disturbance    Balance problem    Cerebral atrophy    Bilateral carotid artery stenosis    Folic acid deficiency       RECOMMENDATIONS:  ECHO DONE NORMAL LVEF 60% MILD LVH , AORTIC SCLEROSIS WITH AGE     MILD MR WILL WATCH      HYPERTENSION- WELL CONTROLLED, WILL CONTINUE CURRENT MEDICATIONS    CAROTID MILD DISEASE ADVISE YEARLY U/S     HYPERLIPIDEMIA- ON STATIN, NEEDS TO WATCH LIPID PROFILE AND LFT'S    PT ASKING FOR BABY ASA , ADVISE HIS DISCREATION   DISCUSSED IN DETAILS ABOUT RISK MODIFICATION    RETURN VISIT IN 12 MONTHS, IF ANY SYMPTOM CHANGE PATIENT ADVISED TO GO TO THE EMERGENCY ROOM.           Tobias Veloz 1942 Cardiology Consult           [phone]

## 2020-05-22 ENCOUNTER — VIRTUAL VISIT (OUTPATIENT)
Dept: CARDIOLOGY | Age: 85
End: 2020-05-22
Payer: MEDICARE

## 2020-05-22 PROCEDURE — 99213 OFFICE O/P EST LOW 20 MIN: CPT | Performed by: INTERNAL MEDICINE

## 2020-05-22 ASSESSMENT — ENCOUNTER SYMPTOMS
NAUSEA: 0
VOMITING: 0
CHEST TIGHTNESS: 0
SHORTNESS OF BREATH: 0
ABDOMINAL PAIN: 0

## 2020-05-22 NOTE — PROGRESS NOTES
Respiratory rate-    Temperature-  Pulse oximetry-     Constitutional: [x] Appears well-developed and well-nourished [] No apparent distress      [] Abnormal-   Mental status  [x] Alert and awake  [] Oriented to person/place/time []Able to follow commands      Eyes:  EOM    [x]  Normal  [] Abnormal-  Sclera  [x]  Normal  [] Abnormal -         Discharge []  None visible  [] Abnormal -    HENT:   [x] Normocephalic, atraumatic. [] Abnormal   [] Mouth/Throat: Mucous membranes are moist.     External Ears [x] Normal  [] Abnormal-     Neck: [x] No visualized mass     Pulmonary/Chest: [x] Respiratory effort normal.  [x] No visualized signs of difficulty breathing or respiratory distress        [] Abnormal-      Musculoskeletal:   [x] Normal gait with no signs of ataxia         [] Normal range of motion of neck        [] Abnormal-       Neurological:        [x] No Facial Asymmetry (Cranial nerve 7 motor function) (limited exam to video visit)          [x] No gaze palsy        [] Abnormal-         Skin:        [] No significant exanthematous lesions or discoloration noted on facial skin         [] Abnormal-            Psychiatric:       [x] Normal Affect [] No Hallucinations        [] Abnormal-     Other pertinent observable physical exam findings-     ASSESSMENT/PLAN:  1. Essential Hypertension. Checked at home and well controlled  2. HPL. On pravachol. Followed by PCP. 3. Moderate Aortic stenosis. Echo 2/2019 EF 60%. Grade I DD. Moderate AS. Mean gradient of 21 mm Hg. Asymptomatic. Will repeat another echo after Covid situation is better controlled. Bhargav Oliveros is a 81 y.o. male being evaluated by a Virtual Visit (video visit) encounter to address concerns as mentioned above. A caregiver was present when appropriate.  Due to this being a TeleHealth encounter (During ZUVQZ-54 public health emergency), evaluation of the following organ systems was limited:

## 2021-04-22 VITALS
OXYGEN SATURATION: 96 % | WEIGHT: 193 LBS | HEART RATE: 77 BPM | TEMPERATURE: 97.5 F | BODY MASS INDEX: 24.77 KG/M2 | SYSTOLIC BLOOD PRESSURE: 112 MMHG | HEIGHT: 74 IN | DIASTOLIC BLOOD PRESSURE: 60 MMHG

## 2021-05-14 ENCOUNTER — OFFICE VISIT (OUTPATIENT)
Dept: CARDIOLOGY | Age: 86
End: 2021-05-14
Payer: MEDICARE

## 2021-05-14 VITALS
BODY MASS INDEX: 24.77 KG/M2 | SYSTOLIC BLOOD PRESSURE: 130 MMHG | HEART RATE: 85 BPM | DIASTOLIC BLOOD PRESSURE: 65 MMHG | HEIGHT: 74 IN | WEIGHT: 193 LBS

## 2021-05-14 DIAGNOSIS — I35.0 NONRHEUMATIC AORTIC VALVE STENOSIS: ICD-10-CM

## 2021-05-14 DIAGNOSIS — I10 HYPERTENSION, ESSENTIAL: ICD-10-CM

## 2021-05-14 DIAGNOSIS — I10 ESSENTIAL HYPERTENSION: Primary | ICD-10-CM

## 2021-05-14 DIAGNOSIS — Z87.891 FORMER TOBACCO USE: ICD-10-CM

## 2021-05-14 DIAGNOSIS — E11.9 CONTROLLED TYPE 2 DIABETES MELLITUS WITHOUT COMPLICATION, UNSPECIFIED WHETHER LONG TERM INSULIN USE (HCC): ICD-10-CM

## 2021-05-14 DIAGNOSIS — E78.2 MIXED HYPERLIPIDEMIA: ICD-10-CM

## 2021-05-14 PROCEDURE — 99213 OFFICE O/P EST LOW 20 MIN: CPT | Performed by: INTERNAL MEDICINE

## 2021-05-14 PROCEDURE — 93005 ELECTROCARDIOGRAM TRACING: CPT | Performed by: INTERNAL MEDICINE

## 2021-05-14 PROCEDURE — 93010 ELECTROCARDIOGRAM REPORT: CPT | Performed by: INTERNAL MEDICINE

## 2021-05-14 PROCEDURE — 4004F PT TOBACCO SCREEN RCVD TLK: CPT | Performed by: INTERNAL MEDICINE

## 2021-05-14 PROCEDURE — 4040F PNEUMOC VAC/ADMIN/RCVD: CPT | Performed by: INTERNAL MEDICINE

## 2021-05-14 PROCEDURE — 1123F ACP DISCUSS/DSCN MKR DOCD: CPT | Performed by: INTERNAL MEDICINE

## 2021-05-14 PROCEDURE — G8420 CALC BMI NORM PARAMETERS: HCPCS | Performed by: INTERNAL MEDICINE

## 2021-05-14 PROCEDURE — 99214 OFFICE O/P EST MOD 30 MIN: CPT | Performed by: INTERNAL MEDICINE

## 2021-05-14 PROCEDURE — G8427 DOCREV CUR MEDS BY ELIG CLIN: HCPCS | Performed by: INTERNAL MEDICINE

## 2021-05-14 NOTE — PROGRESS NOTES
Cardiology Consultation  [facility]      05/14/21      CC:  Patient is here for     HPI:  Judy Doshi  is doing well from a cardiac standpoint. Good functional capacity with no significant change in functional capacity. No chest pain, no dyspnea, no PND, no syncope or pre-syncope, no orthopnea. No symptoms of CHF or angina/chest pain. Past Medical History:   Diagnosis Date    Anemia     Balanoposthitis     BPH (benign prostatic hyperplasia)     Cardiac murmur     Chronic pain of left knee     DM (diabetes mellitus) (HCC)     Hyperlipidemia     Hypertension     Thrombocytopenia (HCC)        Past Surgical History:   Procedure Laterality Date    COLONOSCOPY      EYE SURGERY  2020       Family History   Problem Relation Age of Onset    Arthritis Mother     Cancer Father        Social History     Socioeconomic History    Marital status:       Spouse name: None    Number of children: None    Years of education: None    Highest education level: None   Occupational History    None   Social Needs    Financial resource strain: None    Food insecurity     Worry: None     Inability: None    Transportation needs     Medical: None     Non-medical: None   Tobacco Use    Smoking status: Former Smoker    Smokeless tobacco: Current User   Substance and Sexual Activity    Alcohol use: No    Drug use: No    Sexual activity: None   Lifestyle    Physical activity     Days per week: None     Minutes per session: None    Stress: None   Relationships    Social connections     Talks on phone: None     Gets together: None     Attends Restorationist service: None     Active member of club or organization: None     Attends meetings of clubs or organizations: None     Relationship status: None    Intimate partner violence     Fear of current or ex partner: None     Emotionally abused: None     Physically abused: None     Forced sexual activity: None   Other Topics Concern    None   Social History Narrative    None        No Known Allergies    Current Outpatient Medications   Medication Sig Dispense Refill    folic acid (FOLVITE) 1 MG tablet Take 1 mg by mouth daily      Cyanocobalamin (VITAMIN B 12 PO) Take by mouth      traMADol (ULTRAM) 50 MG tablet Take 50 mg by mouth every 6 hours as needed for Pain      aspirin 81 MG tablet Take 81 mg by mouth daily       amLODIPine (NORVASC) 5 MG tablet Take 5 mg by mouth daily       finasteride (PROSCAR) 5 MG tablet Take 5 mg by mouth daily       lisinopril (PRINIVIL;ZESTRIL) 10 MG tablet Take 10 mg by mouth daily       pravastatin (PRAVACHOL) 40 MG tablet Take 40 mg by mouth daily        No current facility-administered medications for this visit. Patient Active Problem List   Diagnosis    BPH (benign prostatic hyperplasia)    Hyperlipidemia    Hypertension    Easy bruising    TIA involving left internal carotid artery    Chronic cerebral ischemia    Dementia without behavioral disturbance (HCC)    Balance problem    Cerebral atrophy (HCC)    Bilateral carotid artery stenosis    Folic acid deficiency           REVIEW OF SYSTEMS:    · Constitutional: there has been no unanticipated weight loss. There's been No change in energy level, No change in activity level. · Eyes: No visual changes or diplopia. No scleral icterus. · ENT: No Headaches, hearing loss or vertigo. No mouth sores or sore throat. · Cardiovascular: per hpi  · Respiratory: per hpi  · Gastrointestinal: No abdominal pain, appetite loss, blood in stools. No change in bowel or bladder habits. · Genitourinary: No dysuria, trouble voiding, or hematuria. · Musculoskeletal:  No gait disturbance, No weakness or joint complaints. · Integumentary: No rash or pruritis. · Neurological: No headache, diplopia, change in muscle strength, numbness or tingling. No change in gait, balance, coordination, mood, affect, memory, mentation, behavior.   · Psychiatric: No new anxiety or depression. · Endocrine: No temperature intolerance. No excessive thirst, fluid intake, or urination. No tremor. · Hematologic/Lymphatic: No abnormal bruising or bleeding, blood clots or swollen lymph nodes. · Allergic/Immunologic: No nasal congestion or hives. Physical Exam:   Vitals: /65   Pulse 85   Ht 6' 2\" (1.88 m)   Wt 193 lb (87.5 kg)   BMI 24.78 kg/m²   General appearance: alert and cooperative with exam  HEENT: Head: Normocephalic, no lesions, without obvious abnormality. Eyes: PERRL, EOMI  Ears: Not obvious deformations or lack of hearing  Neck: no carotid bruit, no JVD  Lungs: clear to auscultation bilaterally  Heart: regular rate and rhythm, S1, S2 normal, no murmur, click, rub or gallop  Abdomen: soft, non-tender; bowel sounds normal; no masses,  no organomegaly  Extremities: extremities normal, atraumatic, no cyanosis or edema  Neurologic: Mental status: Alert, oriented, thought content appropriate  Skin: WNL for age and condition, no obvious rashes or leasions      EKG: Normal Sinus Rhythm with no ischemic changes. Reviewed today's ECG along serial ECGs    LAST ECHO: 2/14/19  Left ventricle is normal in size Global left ventricular systolic function  is normal Estimated ejection fraction is 60 % . Mild left ventricular hypertrophy. Grade I (mild) left ventricular diastolic dysfunction. Aortic leaflet calcification with moderate stenosis. Peak instantaneous gradient 36 mmHg and mean gradient 21 mmHg. Trivial aortic insufficiency. Mitral annular calcification is seen. Mild mitral regurgitation. Normal tricuspid valve leaflets. Mild tricuspid regurgitation. Estimated right ventricular systolic pressure is 32 mmHg. No significant pericardial effusion is seen. LAST STRESS:    LAST CATH:      Past Medical and Surgical History, Problem List, Allergies, Medications, Labs, Imaging, all reviewed extensively in EMR and with the patient.     Assessment and Plan:    1. Essential Hypertension. Checked at home and well controlled  2. HPL. On pravachol. Followed by PCP. 3. Moderate Aortic stenosis. Echo 2/2019 EF 60%. Grade I DD. Moderate AS. Mean gradient of 21 mm Hg. Asymptomatic. Follow with echoes. Skipped last year due to covid. 4. Former Tobacco abuse. Chronic. Discussed extensively. 5. Type 2 Diabetes. Chronic. LDL goal < 70 and BP goal <130/80. Cont to optimize therapy. Thank you for allowing me to participate in the care of this patient, please do not hesitate to call if you have any questions. Thiago Parker, [id number] [facility]  [facility] [employer]  [phone]

## 2021-05-21 ENCOUNTER — HOSPITAL ENCOUNTER (OUTPATIENT)
Dept: NON INVASIVE DIAGNOSTICS | Age: 86
Discharge: HOME | End: 2021-05-21
Payer: MEDICARE

## 2021-05-21 DIAGNOSIS — I35.0 NONRHEUMATIC AORTIC VALVE STENOSIS: ICD-10-CM

## 2021-05-21 LAB
LV EF: 58 %
LVEF MODALITY: NORMAL

## 2021-05-21 PROCEDURE — 93306 TTE W/DOPPLER COMPLETE: CPT

## 2022-06-10 ENCOUNTER — TELEPHONE (OUTPATIENT)
Dept: INTERVENTIONAL RADIOLOGY/VASCULAR | Age: 87
End: 2022-06-10

## 2022-06-17 ENCOUNTER — HOSPITAL ENCOUNTER (OUTPATIENT)
Dept: CT IMAGING | Age: 87
Discharge: HOME | End: 2022-06-19
Payer: MEDICARE

## 2022-06-17 VITALS
SYSTOLIC BLOOD PRESSURE: 103 MMHG | RESPIRATION RATE: 18 BRPM | DIASTOLIC BLOOD PRESSURE: 60 MMHG | TEMPERATURE: 99.1 F | BODY MASS INDEX: 24.13 KG/M2 | HEIGHT: 74 IN | OXYGEN SATURATION: 100 % | HEART RATE: 62 BPM | WEIGHT: 188 LBS

## 2022-06-17 DIAGNOSIS — D64.9 ANEMIA, UNSPECIFIED TYPE: ICD-10-CM

## 2022-06-17 DIAGNOSIS — D61.818 ACQUIRED DECREASE OF ALL CELLS IN THE BLOOD (HCC): ICD-10-CM

## 2022-06-17 LAB
INR BLD: 0.9
PARTIAL THROMBOPLASTIN TIME: 21.9 SEC (ref 20.5–30.5)
PLATELET # BLD: 117 K/UL (ref 138–453)
PROTHROMBIN TIME: 10.2 SEC (ref 9.1–12.3)

## 2022-06-17 PROCEDURE — 88275 CYTOGENETICS 100-300: CPT

## 2022-06-17 PROCEDURE — 6360000002 HC RX W HCPCS: Performed by: RADIOLOGY

## 2022-06-17 PROCEDURE — 88184 FLOWCYTOMETRY/ TC 1 MARKER: CPT

## 2022-06-17 PROCEDURE — 2709999900 HC NON-CHARGEABLE SUPPLY

## 2022-06-17 PROCEDURE — 88300 SURGICAL PATH GROSS: CPT

## 2022-06-17 PROCEDURE — 2580000003 HC RX 258: Performed by: PHYSICIAN ASSISTANT

## 2022-06-17 PROCEDURE — 88237 TISSUE CULTURE BONE MARROW: CPT

## 2022-06-17 PROCEDURE — 88185 FLOWCYTOMETRY/TC ADD-ON: CPT

## 2022-06-17 PROCEDURE — 2709999900 CT BIOPSY BONE MARROW

## 2022-06-17 PROCEDURE — 85610 PROTHROMBIN TIME: CPT

## 2022-06-17 PROCEDURE — 36415 COLL VENOUS BLD VENIPUNCTURE: CPT

## 2022-06-17 PROCEDURE — 7100000010 HC PHASE II RECOVERY - FIRST 15 MIN

## 2022-06-17 PROCEDURE — 88264 CHROMOSOME ANALYSIS 20-25: CPT

## 2022-06-17 PROCEDURE — 77012 CT SCAN FOR NEEDLE BIOPSY: CPT

## 2022-06-17 PROCEDURE — 85049 AUTOMATED PLATELET COUNT: CPT

## 2022-06-17 PROCEDURE — 85730 THROMBOPLASTIN TIME PARTIAL: CPT

## 2022-06-17 PROCEDURE — 88271 CYTOGENETICS DNA PROBE: CPT

## 2022-06-17 PROCEDURE — 88280 CHROMOSOME KARYOTYPE STUDY: CPT

## 2022-06-17 PROCEDURE — 7100000011 HC PHASE II RECOVERY - ADDTL 15 MIN

## 2022-06-17 RX ORDER — FENTANYL CITRATE 50 UG/ML
INJECTION, SOLUTION INTRAMUSCULAR; INTRAVENOUS
Status: COMPLETED | OUTPATIENT
Start: 2022-06-17 | End: 2022-06-17

## 2022-06-17 RX ORDER — MELOXICAM 15 MG/1
15 TABLET ORAL DAILY
COMMUNITY
End: 2022-11-01

## 2022-06-17 RX ORDER — ACETAMINOPHEN 325 MG/1
650 TABLET ORAL EVERY 4 HOURS PRN
Status: DISCONTINUED | OUTPATIENT
Start: 2022-06-17 | End: 2022-06-20 | Stop reason: HOSPADM

## 2022-06-17 RX ORDER — SODIUM CHLORIDE 9 MG/ML
INJECTION, SOLUTION INTRAVENOUS CONTINUOUS
Status: DISCONTINUED | OUTPATIENT
Start: 2022-06-17 | End: 2022-06-20 | Stop reason: HOSPADM

## 2022-06-17 RX ORDER — MIDAZOLAM HYDROCHLORIDE 2 MG/2ML
INJECTION, SOLUTION INTRAMUSCULAR; INTRAVENOUS
Status: COMPLETED | OUTPATIENT
Start: 2022-06-17 | End: 2022-06-17

## 2022-06-17 RX ADMIN — SODIUM CHLORIDE: 9 INJECTION, SOLUTION INTRAVENOUS at 10:30

## 2022-06-17 RX ADMIN — MIDAZOLAM HYDROCHLORIDE 0.5 MG: 1 INJECTION, SOLUTION INTRAMUSCULAR; INTRAVENOUS at 12:35

## 2022-06-17 RX ADMIN — FENTANYL CITRATE 50 MCG: 50 INJECTION INTRAMUSCULAR; INTRAVENOUS at 12:32

## 2022-06-17 ASSESSMENT — PAIN - FUNCTIONAL ASSESSMENT
PAIN_FUNCTIONAL_ASSESSMENT: NONE - DENIES PAIN
PAIN_FUNCTIONAL_ASSESSMENT: PREVENTS OR INTERFERES SOME ACTIVE ACTIVITIES AND ADLS

## 2022-06-17 ASSESSMENT — PAIN DESCRIPTION - LOCATION: LOCATION: BACK

## 2022-06-17 ASSESSMENT — PAIN DESCRIPTION - FREQUENCY: FREQUENCY: CONTINUOUS

## 2022-06-17 ASSESSMENT — PAIN DESCRIPTION - PAIN TYPE: TYPE: CHRONIC PAIN

## 2022-06-17 ASSESSMENT — PAIN DESCRIPTION - ORIENTATION: ORIENTATION: MID

## 2022-06-17 ASSESSMENT — PAIN DESCRIPTION - ONSET: ONSET: GRADUAL

## 2022-06-17 ASSESSMENT — PAIN SCALES - GENERAL
PAINLEVEL_OUTOF10: 0
PAINLEVEL_OUTOF10: 7

## 2022-06-17 ASSESSMENT — PAIN DESCRIPTION - DESCRIPTORS: DESCRIPTORS: ACHING

## 2022-06-17 NOTE — BRIEF OP NOTE
Brief Postoperative Note    Trevino Cam  YOB: 1935  [id number]    Pre-operative Diagnosis: Anemia    Post-operative Diagnosis: Same    Procedure:  CT guided random bone marrow biopsy    Anesthesia: Moderate Sedation    Surgeons/Assistants: Hilario    Estimated Blood Loss: less than 50     Complications: None    Specimens: Was Obtained: single 11 gauge sample and aspirate    Electronically signed by Maninder Bernal MD on 6/17/2022 at 12:53 PM

## 2022-06-17 NOTE — H&P
History and Physical    Pt Name: Jannie Taylor  MRN: [id number]  YOB: 1935  Date of evaluation: 6/17/2022    SUBJECTIVE:   History of Chief Complaint:    Patient presents preprocedure for bone marrow biopsy. He is present with his son today. He had been experiencing weakness and decreased appetite, tiredness. He was found to have low hemoglobin on labs 3 weeks ago per son. He had a transfusion and then a second one last week. He has been scheduled for biopsy today. Past Medical History    has a past medical history of Anemia, Aortic stenosis, Balanoposthitis, BPH (benign prostatic hyperplasia), Cardiac murmur, Chronic pain of left knee, DM (diabetes mellitus) ([facility] Hearing loss, Hyperlipidemia, Hypertension, and Thrombocytopenia ([facility] Past Surgical History   has a past surgical history that includes Colonoscopy; Cataract extraction (2020); and Total hip arthroplasty. Medications  Prior to Admission medications    Medication Sig Start Date End Date Taking? Authorizing Provider   meloxicam (MOBIC) 15 MG tablet Take 15 mg by mouth daily   Yes Historical Provider, MD   folic acid (FOLVITE) 1 MG tablet Take 1 mg by mouth daily    Historical Provider, MD   Cyanocobalamin (VITAMIN B 12 PO) Take by mouth    Historical Provider, MD   traMADol (ULTRAM) 50 MG tablet Take 50 mg by mouth every 6 hours as needed for Pain    Historical Provider, MD   aspirin 81 MG tablet Take 81 mg by mouth daily     Historical Provider, MD   amLODIPine (NORVASC) 5 MG tablet Take 5 mg by mouth daily  8/5/16   Historical Provider, MD   finasteride (PROSCAR) 5 MG tablet Take 5 mg by mouth daily  8/5/16   Historical Provider, MD   lisinopril (PRINIVIL;ZESTRIL) 10 MG tablet Take 10 mg by mouth daily  8/5/16   Historical Provider, MD   pravastatin (PRAVACHOL) 40 MG tablet Take 40 mg by mouth daily  8/5/16   Historical Provider, MD     Allergies  has No Known Allergies.   Family History  family history includes Arthritis in his mother; Cancer in his father. Social History   reports that he has quit smoking. He uses smokeless tobacco.   reports no history of alcohol use. reports no history of drug use. Marital Status   Occupation farmer    Review of Systems:  CONSTITUTIONAL:   See HPI   EYES:   negative for double vision, blurred vision and photophobia    HEENT:   negative for tinnitus, epistaxis and sore throat     RESPIRATORY:   negative for cough, shortness of breath, wheezing     CARDIOVASCULAR:   negative for chest pain, palpitations, syncope, edema     GASTROINTESTINAL:   negative for nausea, vomiting     GENITOURINARY:   negative for incontinence     MUSCULOSKELETAL:   negative for neck or back pain     NEUROLOGICAL:   Negative for weakness and tingling  negative for headaches and dizziness     PSYCHIATRIC:   negative for anxiety         OBJECTIVE:   VITALS:  height is 6' 2\" (1.88 m) and weight is 188 lb (85.3 kg). Per nursing database  CONSTITUTIONAL:alert & cooperative, no acute distress. Pleasant. Present with son. SKIN:  Warm and dry, no rashes on exposed areas of skin. Tanned skin. Scattered ecchymoses noted. HEAD:  Normocephalic, atraumatic   EYES: EOMs intact. Conjunctiva slightly injected. EARS:  Hearing loss. NOSE:  Nares patent. No rhinorrhea. MOUTH/THROAT:  benign  NECK:good ROM   LUNGS: Clear to auscultation bilaterally, no wheezes. CARDIOVASCULAR: systolic murmur noted. (echo in chart) Regular rate and rhythm. ABDOMEN: soft, non tender, non distended. Thin abdomen. EXTREMITIES: no edema bilateral lower extremities. IMPRESSIONS:   1. anemia  2.  has a past medical history of Anemia, Aortic stenosis, Balanoposthitis, BPH (benign prostatic hyperplasia), Cardiac murmur, Chronic pain of left knee, DM (diabetes mellitus) ([address] Hearing loss, Hyperlipidemia, Hypertension, and Thrombocytopenia ([address] PLANS:   1.  Bone marrow biopsy    KALEB Morejon PA-C  Electronically signed 6/17/2022 at 11:16 AM

## 2022-06-17 NOTE — PRE SEDATION
Sedation Pre-Procedure Note    Patient Name: Dileep Garibay   YOB: 1935  Room/Bed: Room/bed info not found  Medical Record Number: [id number]  Date: 6/17/2022   Time: 12:51 PM       Indication:  Anemia    Consent: I have discussed with the patient and/or the patient representative the indication, alternatives, and the possible risks and/or complications of the planned procedure and the anesthesia methods. The patient and/or patient representative appear to understand and agree to proceed. Vital Signs:   Vitals:    06/17/22 1245   BP: 115/60   Pulse: 71   Resp: 11   SpO2: 100%       Past Medical History:   has a past medical history of Anemia, Aortic stenosis, Balanoposthitis, BPH (benign prostatic hyperplasia), Cardiac murmur, Chronic pain of left knee, DM (diabetes mellitus) ([address] Hearing loss, Hyperlipidemia, Hypertension, and Thrombocytopenia ([address] Past Surgical History:   has a past surgical history that includes Colonoscopy; Cataract extraction (2020); and Total hip arthroplasty. Medications:   Scheduled Meds:   Continuous Infusions:    sodium chloride 20 mL/hr at 06/17/22 1030     PRN Meds:   Home Meds:   Prior to Admission medications    Medication Sig Start Date End Date Taking?  Authorizing Provider   meloxicam (MOBIC) 15 MG tablet Take 15 mg by mouth daily   Yes Historical Provider, MD   folic acid (FOLVITE) 1 MG tablet Take 1 mg by mouth daily    Historical Provider, MD   Cyanocobalamin (VITAMIN B 12 PO) Take by mouth    Historical Provider, MD   traMADol (ULTRAM) 50 MG tablet Take 50 mg by mouth every 6 hours as needed for Pain    Historical Provider, MD   aspirin 81 MG tablet Take 81 mg by mouth daily     Historical Provider, MD   amLODIPine (NORVASC) 5 MG tablet Take 5 mg by mouth daily  8/5/16   Historical Provider, MD   finasteride (PROSCAR) 5 MG tablet Take 5 mg by mouth daily  8/5/16   Historical Provider, MD   lisinopril (PRINIVIL;ZESTRIL) 10 MG tablet Take 10 mg by mouth daily  8/5/16   Historical Provider, MD   pravastatin (PRAVACHOL) 40 MG tablet Take 40 mg by mouth daily  8/5/16   Historical Provider, MD     Coumadin Use Last 7 Days:  no  Antiplatelet drug therapy use last 7 days: no  Other anticoagulant use last 7 days: no  Additional Medication Information:  See [facility]      Pre-Sedation Documentation and Exam:   I have reviewed the patient's history and review of systems.     Mallampati Airway Assessment:  Mallampati Class II - (soft palate, fauces & uvula are visible)    Prior History of Anesthesia Complications:   none    ASA Classification:  Class 3 - A patient with severe systemic disease that limits activity but is not incapacitating    Sedation/ Anesthesia Plan:   intravenous sedation    Medications Planned:   midazolam (Versed) intravenously and fentanyl intravenously    Patient is an appropriate candidate for plan of sedation: yes    Electronically signed by Ary Lopez MD on 6/17/2022 at 12:51 PM

## 2022-06-21 ENCOUNTER — NURSE ONLY (OUTPATIENT)
Dept: LAB | Age: 87
End: 2022-06-21

## 2022-06-21 LAB
BONE MARROW REPORT: NORMAL
FLOW CYTOMETRY, BM: NORMAL

## 2022-06-30 LAB — CHROMOSOME STUDY: NORMAL

## 2022-11-01 ENCOUNTER — OFFICE VISIT (OUTPATIENT)
Dept: CARDIOLOGY | Age: 87
End: 2022-11-01
Payer: MEDICARE

## 2022-11-01 VITALS
SYSTOLIC BLOOD PRESSURE: 118 MMHG | WEIGHT: 176.74 LBS | HEIGHT: 74 IN | DIASTOLIC BLOOD PRESSURE: 78 MMHG | HEART RATE: 82 BPM | BODY MASS INDEX: 22.68 KG/M2

## 2022-11-01 DIAGNOSIS — I10 HYPERTENSION, ESSENTIAL: ICD-10-CM

## 2022-11-01 DIAGNOSIS — E11.9 CONTROLLED TYPE 2 DIABETES MELLITUS WITHOUT COMPLICATION, UNSPECIFIED WHETHER LONG TERM INSULIN USE (HCC): ICD-10-CM

## 2022-11-01 DIAGNOSIS — I35.0 AORTIC STENOSIS, MODERATE: ICD-10-CM

## 2022-11-01 DIAGNOSIS — E66.09 CLASS 1 OBESITY DUE TO EXCESS CALORIES WITH SERIOUS COMORBIDITY IN ADULT, UNSPECIFIED BMI: ICD-10-CM

## 2022-11-01 DIAGNOSIS — D50.0 BLOOD LOSS ANEMIA: ICD-10-CM

## 2022-11-01 DIAGNOSIS — E78.00 PURE HYPERCHOLESTEROLEMIA: ICD-10-CM

## 2022-11-01 DIAGNOSIS — I10 ESSENTIAL HYPERTENSION: Primary | ICD-10-CM

## 2022-11-01 PROCEDURE — 1123F ACP DISCUSS/DSCN MKR DOCD: CPT | Performed by: INTERNAL MEDICINE

## 2022-11-01 PROCEDURE — G8484 FLU IMMUNIZE NO ADMIN: HCPCS | Performed by: INTERNAL MEDICINE

## 2022-11-01 PROCEDURE — G8420 CALC BMI NORM PARAMETERS: HCPCS | Performed by: INTERNAL MEDICINE

## 2022-11-01 PROCEDURE — G8427 DOCREV CUR MEDS BY ELIG CLIN: HCPCS | Performed by: INTERNAL MEDICINE

## 2022-11-01 PROCEDURE — 99212 OFFICE O/P EST SF 10 MIN: CPT | Performed by: INTERNAL MEDICINE

## 2022-11-01 PROCEDURE — 99214 OFFICE O/P EST MOD 30 MIN: CPT | Performed by: INTERNAL MEDICINE

## 2022-11-01 PROCEDURE — 93005 ELECTROCARDIOGRAM TRACING: CPT | Performed by: INTERNAL MEDICINE

## 2022-11-01 PROCEDURE — 4004F PT TOBACCO SCREEN RCVD TLK: CPT | Performed by: INTERNAL MEDICINE

## 2022-11-01 PROCEDURE — 93010 ELECTROCARDIOGRAM REPORT: CPT | Performed by: INTERNAL MEDICINE

## 2022-11-01 NOTE — PROGRESS NOTES
Cardiology Consultation  [facility]      11/01/22      CC:  Patient is here for     HPI:  Mauro Collado  is doing well from a cardiac standpoint. Good functional capacity with no significant change in functional capacity. No chest pain, no dyspnea, no PND, no syncope or pre-syncope, no orthopnea. No symptoms of CHF or angina/chest pain. Past Medical History:   Diagnosis Date    Anemia     Aortic stenosis     Balanoposthitis     BPH (benign prostatic hyperplasia)     Cardiac murmur     Chronic pain of left knee     DM (diabetes mellitus) (HCC)     Hearing loss     Hyperlipidemia     Hypertension     Thrombocytopenia (HCC)        Past Surgical History:   Procedure Laterality Date    CATARACT EXTRACTION  2020    COLONOSCOPY      CT BONE MARROW BIOPSY  6/17/2022    CT BONE MARROW BIOPSY 6/17/2022 [facility] CT SCAN    TOTAL HIP ARTHROPLASTY         Family History   Problem Relation Age of Onset    Arthritis Mother     Cancer Father        Social History     Socioeconomic History    Marital status:      Spouse name: None    Number of children: None    Years of education: None    Highest education level: None   Tobacco Use    Smoking status: Former    Smokeless tobacco: Current   Vaping Use    Vaping Use: Never used   Substance and Sexual Activity    Alcohol use: No    Drug use: No        No Known Allergies    Current Outpatient Medications   Medication Sig Dispense Refill    folic acid (FOLVITE) 1 MG tablet Take 1 mg by mouth daily      Cyanocobalamin (VITAMIN B 12 PO) Take by mouth      aspirin 81 MG tablet Take 81 mg by mouth daily       finasteride (PROSCAR) 5 MG tablet Take 5 mg by mouth daily       lisinopril (PRINIVIL;ZESTRIL) 10 MG tablet Take 10 mg by mouth daily       pravastatin (PRAVACHOL) 40 MG tablet Take 40 mg by mouth daily        No current facility-administered medications for this visit.         Patient Active Problem List   Diagnosis    BPH (benign prostatic hyperplasia) Hyperlipidemia    Hypertension    Easy bruising    TIA involving left internal carotid artery    Chronic cerebral ischemia    Dementia without behavioral disturbance (HCC)    Balance problem    Cerebral atrophy (HCC)    Bilateral carotid artery stenosis    Folic acid deficiency    Aortic stenosis, moderate           REVIEW OF SYSTEMS:    Constitutional: there has been no unanticipated weight loss. There's been No change in energy level, No change in activity level. Eyes: No visual changes or diplopia. No scleral icterus. ENT: No Headaches, hearing loss or vertigo. No mouth sores or sore throat. Cardiovascular: per hpi  Respiratory: per hpi  Gastrointestinal: No abdominal pain, appetite loss, blood in stools. No change in bowel or bladder habits. Genitourinary: No dysuria, trouble voiding, or hematuria. Musculoskeletal:  No gait disturbance, No weakness or joint complaints. Integumentary: No rash or pruritis. Neurological: No headache, diplopia, change in muscle strength, numbness or tingling. No change in gait, balance, coordination, mood, affect, memory, mentation, behavior. Psychiatric: No new anxiety or depression. Endocrine: No temperature intolerance. No excessive thirst, fluid intake, or urination. No tremor. Hematologic/Lymphatic: No abnormal bruising or bleeding, blood clots or swollen lymph nodes. Allergic/Immunologic: No nasal congestion or hives. Physical Exam:   Vitals: /78   Pulse 82   Ht 6' 2\" (1.88 m)   Wt 176 lb 11.8 oz (80.2 kg)   BMI 22.69 kg/m²   General appearance: alert and cooperative with exam  HEENT: Head: Normocephalic, no lesions, without obvious abnormality.   Eyes: PERRL, EOMI  Ears: Not obvious deformations or lack of hearing  Neck: no carotid bruit, no JVD  Lungs: clear to auscultation bilaterally  Heart: regular rate and rhythm, S1, S2 normal, no murmur, click, rub or gallop  Abdomen: soft, non-tender; bowel sounds normal; no masses,  no 27. Cont to follow. Stable, chronic  Reviewed echoes with him and compared to prior. Grade I Diastolic dysfunction. Stable, chronic  Former Tobacco abuse. Chronic. Discussed extensively. Stable, chronic  Type 2 Diabetes. Chronic. LDL goal < 70 and BP goal <130/80. Cont to optimize therapy. Stable, chronic      Thank you for allowing me to participate in the care of this patient, please do not hesitate to call if you have any questions. Art Nolasco, [id number] [facility]  [facility]. [facility]  [phone]

## 2023-05-17 ENCOUNTER — OFFICE VISIT (OUTPATIENT)
Dept: CARDIOLOGY | Age: 88
End: 2023-05-17
Payer: MEDICARE

## 2023-05-17 VITALS
HEIGHT: 74 IN | WEIGHT: 176 LBS | DIASTOLIC BLOOD PRESSURE: 62 MMHG | SYSTOLIC BLOOD PRESSURE: 124 MMHG | BODY MASS INDEX: 22.59 KG/M2 | HEART RATE: 77 BPM

## 2023-05-17 DIAGNOSIS — I10 ESSENTIAL HYPERTENSION: Primary | ICD-10-CM

## 2023-05-17 PROCEDURE — 99212 OFFICE O/P EST SF 10 MIN: CPT | Performed by: NURSE PRACTITIONER

## 2023-05-17 PROCEDURE — 1123F ACP DISCUSS/DSCN MKR DOCD: CPT | Performed by: NURSE PRACTITIONER

## 2023-05-17 PROCEDURE — 93005 ELECTROCARDIOGRAM TRACING: CPT | Performed by: NURSE PRACTITIONER

## 2023-05-17 PROCEDURE — G8427 DOCREV CUR MEDS BY ELIG CLIN: HCPCS | Performed by: NURSE PRACTITIONER

## 2023-05-17 PROCEDURE — 4004F PT TOBACCO SCREEN RCVD TLK: CPT | Performed by: NURSE PRACTITIONER

## 2023-05-17 PROCEDURE — G8420 CALC BMI NORM PARAMETERS: HCPCS | Performed by: NURSE PRACTITIONER

## 2023-05-17 PROCEDURE — 93010 ELECTROCARDIOGRAM REPORT: CPT | Performed by: NURSE PRACTITIONER

## 2023-05-17 PROCEDURE — 99214 OFFICE O/P EST MOD 30 MIN: CPT | Performed by: NURSE PRACTITIONER

## 2023-05-17 RX ORDER — TAMSULOSIN HYDROCHLORIDE 0.4 MG/1
0.4 CAPSULE ORAL DAILY
Qty: 30 CAPSULE | Refills: 14 | COMMUNITY
Start: 2023-02-28 | End: 2024-05-10

## 2023-05-17 NOTE — PROGRESS NOTES
Cardiology Follow up   [facility]      05/17/23      CC:  Patient is here for follow up     HPI:  Chanel Garg  is doing well from a cardiac standpoint. He states that his sob/anemia is improved so he has more energy. He denies any CP. He states he lives alone and is able to take care of his daily tasks. Past Medical History:   Diagnosis Date    Anemia     Aortic stenosis     Balanoposthitis     BPH (benign prostatic hyperplasia)     Cardiac murmur     Chronic pain of left knee     DM (diabetes mellitus) (HCC)     Hearing loss     Hyperlipidemia     Hypertension     Thrombocytopenia (HCC)        Past Surgical History:   Procedure Laterality Date    CATARACT EXTRACTION  2020    COLONOSCOPY      CT BONE MARROW BIOPSY  6/17/2022    CT BONE MARROW BIOPSY 6/17/2022 [facility] CT SCAN    TOTAL HIP ARTHROPLASTY         Family History   Problem Relation Age of Onset    Arthritis Mother     Cancer Father        Social History     Socioeconomic History    Marital status:      Spouse name: None    Number of children: None    Years of education: None    Highest education level: None   Tobacco Use    Smoking status: Former    Smokeless tobacco: Current   Vaping Use    Vaping Use: Never used   Substance and Sexual Activity    Alcohol use: No    Drug use: No        No Known Allergies    Current Outpatient Medications   Medication Sig Dispense Refill    tamsulosin (FLOMAX) 0.4 MG capsule Take 1 capsule by mouth daily 30 capsule 14    folic acid (FOLVITE) 1 MG tablet Take 1 tablet by mouth daily      Cyanocobalamin (VITAMIN B 12 PO) Take by mouth      aspirin 81 MG tablet Take 1 tablet by mouth daily      finasteride (PROSCAR) 5 MG tablet Take 1 tablet by mouth daily      lisinopril (PRINIVIL;ZESTRIL) 10 MG tablet Take 1 tablet by mouth daily      pravastatin (PRAVACHOL) 40 MG tablet Take 1 tablet by mouth daily       No current facility-administered medications for this visit.         Patient Active

## 2023-11-02 ENCOUNTER — HOSPITAL ENCOUNTER (INPATIENT)
Age: 88
LOS: 8 days | Discharge: NURSING, REHAB OR LONG-TERM CARE | End: 2023-11-10
Attending: HOSPITALIST | Admitting: PHYSICIAN ASSISTANT
Payer: MEDICARE

## 2023-11-02 DIAGNOSIS — G89.18 POST-OP PAIN: Primary | ICD-10-CM

## 2023-11-02 DIAGNOSIS — S72.001A CLOSED FRACTURE OF RIGHT HIP, INITIAL ENCOUNTER (HCC): ICD-10-CM

## 2023-11-02 PROCEDURE — 99223 1ST HOSP IP/OBS HIGH 75: CPT | Performed by: PHYSICIAN ASSISTANT

## 2023-11-02 PROCEDURE — 6370000000 HC RX 637 (ALT 250 FOR IP): Performed by: PHYSICIAN ASSISTANT

## 2023-11-02 PROCEDURE — 1200000003 HC TELEMETRY R&B

## 2023-11-02 PROCEDURE — 6820000001 HC L2 TRAUMA SURGERY EVALUATION: Performed by: ORTHOPAEDIC SURGERY

## 2023-11-02 PROCEDURE — 30233N1 TRANSFUSION OF NONAUTOLOGOUS RED BLOOD CELLS INTO PERIPHERAL VEIN, PERCUTANEOUS APPROACH: ICD-10-PCS | Performed by: PHYSICIAN ASSISTANT

## 2023-11-02 RX ORDER — SODIUM CHLORIDE 0.9 % (FLUSH) 0.9 %
5-40 SYRINGE (ML) INJECTION PRN
Status: DISCONTINUED | OUTPATIENT
Start: 2023-11-02 | End: 2023-11-10 | Stop reason: HOSPADM

## 2023-11-02 RX ORDER — ENOXAPARIN SODIUM 100 MG/ML
40 INJECTION SUBCUTANEOUS EVERY 24 HOURS
Status: DISCONTINUED | OUTPATIENT
Start: 2023-11-03 | End: 2023-11-03

## 2023-11-02 RX ORDER — ONDANSETRON 4 MG/1
4 TABLET, ORALLY DISINTEGRATING ORAL EVERY 8 HOURS PRN
Status: DISCONTINUED | OUTPATIENT
Start: 2023-11-02 | End: 2023-11-10 | Stop reason: HOSPADM

## 2023-11-02 RX ORDER — FOLIC ACID 1 MG/1
1 TABLET ORAL DAILY
Status: DISCONTINUED | OUTPATIENT
Start: 2023-11-03 | End: 2023-11-10 | Stop reason: HOSPADM

## 2023-11-02 RX ORDER — FINASTERIDE 5 MG/1
5 TABLET, FILM COATED ORAL DAILY
Status: DISCONTINUED | OUTPATIENT
Start: 2023-11-03 | End: 2023-11-10 | Stop reason: HOSPADM

## 2023-11-02 RX ORDER — HYDROCODONE BITARTRATE AND ACETAMINOPHEN 5; 325 MG/1; MG/1
1 TABLET ORAL EVERY 4 HOURS PRN
Status: DISCONTINUED | OUTPATIENT
Start: 2023-11-02 | End: 2023-11-10 | Stop reason: HOSPADM

## 2023-11-02 RX ORDER — TAMSULOSIN HYDROCHLORIDE 0.4 MG/1
0.4 CAPSULE ORAL DAILY
Status: DISCONTINUED | OUTPATIENT
Start: 2023-11-03 | End: 2023-11-10 | Stop reason: HOSPADM

## 2023-11-02 RX ORDER — SODIUM CHLORIDE 9 MG/ML
INJECTION, SOLUTION INTRAVENOUS PRN
Status: DISCONTINUED | OUTPATIENT
Start: 2023-11-02 | End: 2023-11-10 | Stop reason: HOSPADM

## 2023-11-02 RX ORDER — MORPHINE SULFATE 2 MG/ML
2 INJECTION, SOLUTION INTRAMUSCULAR; INTRAVENOUS
Status: DISCONTINUED | OUTPATIENT
Start: 2023-11-02 | End: 2023-11-10 | Stop reason: HOSPADM

## 2023-11-02 RX ORDER — MAGNESIUM SULFATE IN WATER 40 MG/ML
2000 INJECTION, SOLUTION INTRAVENOUS PRN
Status: DISCONTINUED | OUTPATIENT
Start: 2023-11-02 | End: 2023-11-10 | Stop reason: HOSPADM

## 2023-11-02 RX ORDER — ONDANSETRON 2 MG/ML
4 INJECTION INTRAMUSCULAR; INTRAVENOUS EVERY 6 HOURS PRN
Status: DISCONTINUED | OUTPATIENT
Start: 2023-11-02 | End: 2023-11-10 | Stop reason: HOSPADM

## 2023-11-02 RX ORDER — PRAVASTATIN SODIUM 40 MG
40 TABLET ORAL DAILY
Status: DISCONTINUED | OUTPATIENT
Start: 2023-11-03 | End: 2023-11-10 | Stop reason: HOSPADM

## 2023-11-02 RX ORDER — POTASSIUM CHLORIDE 20 MEQ/1
40 TABLET, EXTENDED RELEASE ORAL PRN
Status: DISCONTINUED | OUTPATIENT
Start: 2023-11-02 | End: 2023-11-10 | Stop reason: HOSPADM

## 2023-11-02 RX ORDER — ACETAMINOPHEN 325 MG/1
650 TABLET ORAL EVERY 6 HOURS PRN
Status: DISCONTINUED | OUTPATIENT
Start: 2023-11-02 | End: 2023-11-10 | Stop reason: HOSPADM

## 2023-11-02 RX ORDER — POTASSIUM CHLORIDE 7.45 MG/ML
10 INJECTION INTRAVENOUS PRN
Status: DISCONTINUED | OUTPATIENT
Start: 2023-11-02 | End: 2023-11-10 | Stop reason: HOSPADM

## 2023-11-02 RX ORDER — ACETAMINOPHEN 650 MG/1
650 SUPPOSITORY RECTAL EVERY 6 HOURS PRN
Status: DISCONTINUED | OUTPATIENT
Start: 2023-11-02 | End: 2023-11-10 | Stop reason: HOSPADM

## 2023-11-02 RX ORDER — MORPHINE SULFATE 4 MG/ML
4 INJECTION, SOLUTION INTRAMUSCULAR; INTRAVENOUS
Status: DISCONTINUED | OUTPATIENT
Start: 2023-11-02 | End: 2023-11-04 | Stop reason: SDUPTHER

## 2023-11-02 RX ORDER — POLYETHYLENE GLYCOL 3350 17 G/17G
17 POWDER, FOR SOLUTION ORAL DAILY PRN
Status: DISCONTINUED | OUTPATIENT
Start: 2023-11-02 | End: 2023-11-06

## 2023-11-02 RX ORDER — LISINOPRIL 10 MG/1
10 TABLET ORAL DAILY
Status: DISCONTINUED | OUTPATIENT
Start: 2023-11-03 | End: 2023-11-10 | Stop reason: HOSPADM

## 2023-11-02 RX ORDER — SODIUM CHLORIDE 9 MG/ML
INJECTION, SOLUTION INTRAVENOUS CONTINUOUS
Status: ACTIVE | OUTPATIENT
Start: 2023-11-02 | End: 2023-11-04

## 2023-11-02 RX ORDER — SODIUM CHLORIDE 0.9 % (FLUSH) 0.9 %
5-40 SYRINGE (ML) INJECTION EVERY 12 HOURS SCHEDULED
Status: DISCONTINUED | OUTPATIENT
Start: 2023-11-02 | End: 2023-11-10 | Stop reason: HOSPADM

## 2023-11-02 RX ORDER — ASPIRIN 81 MG/1
81 TABLET ORAL DAILY
Status: DISCONTINUED | OUTPATIENT
Start: 2023-11-02 | End: 2023-11-08

## 2023-11-02 RX ORDER — HYDROCODONE BITARTRATE AND ACETAMINOPHEN 5; 325 MG/1; MG/1
2 TABLET ORAL EVERY 4 HOURS PRN
Status: DISCONTINUED | OUTPATIENT
Start: 2023-11-02 | End: 2023-11-10 | Stop reason: HOSPADM

## 2023-11-02 RX ORDER — LANOLIN ALCOHOL/MO/W.PET/CERES
3 CREAM (GRAM) TOPICAL NIGHTLY
Status: DISCONTINUED | OUTPATIENT
Start: 2023-11-02 | End: 2023-11-10 | Stop reason: HOSPADM

## 2023-11-02 RX ADMIN — HYDROCODONE BITARTRATE AND ACETAMINOPHEN 2 TABLET: 5; 325 TABLET ORAL at 20:14

## 2023-11-02 ASSESSMENT — PAIN DESCRIPTION - ORIENTATION: ORIENTATION: RIGHT

## 2023-11-02 ASSESSMENT — PAIN SCALES - GENERAL: PAINLEVEL_OUTOF10: 10

## 2023-11-02 ASSESSMENT — PAIN - FUNCTIONAL ASSESSMENT: PAIN_FUNCTIONAL_ASSESSMENT: PREVENTS OR INTERFERES WITH ALL ACTIVE AND SOME PASSIVE ACTIVITIES

## 2023-11-02 ASSESSMENT — PAIN DESCRIPTION - LOCATION: LOCATION: HIP

## 2023-11-02 ASSESSMENT — PAIN DESCRIPTION - DESCRIPTORS: DESCRIPTORS: ACHING;SHARP

## 2023-11-02 NOTE — PROGRESS NOTES
Patient is an 81year old from [address] [place] [address] Dr SUAZO with Dr Mary Jo Holbrook transferring. Patient with hx of DM, HTN, left hip replacement, myelodysplastic syndrome (on chemo) arrived today after a fall due to leg giving out and landing on right hip. Imaging revealed right intertrochanteric fracture. Head CT and C-Spine negative for acute findings. Creatinine 1.62 (baseline), WBC 4, Hgb 7.3 VS: /74, HR 83, SpO2 98% RA. Accepted in transfer under hospitalist to a [address] [facility] bed.

## 2023-11-03 ENCOUNTER — APPOINTMENT (OUTPATIENT)
Dept: GENERAL RADIOLOGY | Age: 88
End: 2023-11-03
Payer: MEDICARE

## 2023-11-03 ENCOUNTER — ANESTHESIA EVENT (OUTPATIENT)
Dept: OPERATING ROOM | Age: 88
End: 2023-11-03
Payer: MEDICARE

## 2023-11-03 ENCOUNTER — ANESTHESIA (OUTPATIENT)
Dept: OPERATING ROOM | Age: 88
End: 2023-11-03
Payer: MEDICARE

## 2023-11-03 PROBLEM — D46.9 MYELODYSPLASTIC SYNDROME (HCC): Status: ACTIVE | Noted: 2023-11-03

## 2023-11-03 LAB
ABO: NORMAL
ANION GAP SERPL CALC-SCNC: 10 MEQ/L (ref 8–16)
ANISOCYTOSIS BLD QL SMEAR: PRESENT
ANTIBODY SCREEN: NORMAL
AUTO DIFF PNL BLD: ABNORMAL
BASOPHILS ABSOLUTE: 0.1 THOU/MM3 (ref 0–0.1)
BASOPHILS NFR BLD AUTO: 1.3 %
BUN SERPL-MCNC: 25 MG/DL (ref 7–22)
CALCIUM SERPL-MCNC: 8.1 MG/DL (ref 8.5–10.5)
CHLORIDE SERPL-SCNC: 104 MEQ/L (ref 98–111)
CO2 SERPL-SCNC: 22 MEQ/L (ref 23–33)
CREAT SERPL-MCNC: 1.4 MG/DL (ref 0.4–1.2)
DACROCYTES: ABNORMAL
DEPRECATED RDW RBC AUTO: 75 FL (ref 35–45)
ELLIPTOCYTES: ABNORMAL
EOSINOPHIL NFR BLD AUTO: 3.8 %
EOSINOPHILS ABSOLUTE: 0.1 THOU/MM3 (ref 0–0.4)
ERYTHROCYTE [DISTWIDTH] IN BLOOD BY AUTOMATED COUNT: 21.5 % (ref 11.5–14.5)
GFR SERPL CREATININE-BSD FRML MDRD: 48 ML/MIN/1.73M2
GLUCOSE BLD STRIP.AUTO-MCNC: 116 MG/DL (ref 70–108)
GLUCOSE SERPL-MCNC: 117 MG/DL (ref 70–108)
HCT VFR BLD AUTO: 22.7 % (ref 42–52)
HCT VFR BLD AUTO: 27.4 % (ref 42–52)
HGB BLD-MCNC: 6.6 GM/DL (ref 14–18)
HGB BLD-MCNC: 8.1 GM/DL (ref 14–18)
IMM GRANULOCYTES # BLD AUTO: 0.06 THOU/MM3 (ref 0–0.07)
IMM GRANULOCYTES NFR BLD AUTO: 1.5 %
INR PPP: 1.14 (ref 0.85–1.13)
LYMPHOCYTES ABSOLUTE: 0.8 THOU/MM3 (ref 1–4.8)
LYMPHOCYTES NFR BLD AUTO: 21 %
MCH RBC QN AUTO: 28 PG (ref 26–33)
MCHC RBC AUTO-ENTMCNC: 29.1 GM/DL (ref 32.2–35.5)
MCV RBC AUTO: 96.2 FL (ref 80–94)
MONOCYTES ABSOLUTE: 0.5 THOU/MM3 (ref 0.4–1.3)
MONOCYTES NFR BLD AUTO: 13.8 %
NEUTROPHILS NFR BLD AUTO: 58.6 %
NRBC BLD AUTO-RTO: 0 /100 WBC
PATHOLOGIST REVIEW: ABNORMAL
PLATELET # BLD AUTO: 106 THOU/MM3 (ref 130–400)
PLATELET BLD QL SMEAR: ABNORMAL
PMV BLD AUTO: 9.6 FL (ref 9.4–12.4)
POIKILOCYTES: ABNORMAL
POTASSIUM SERPL-SCNC: 4.3 MEQ/L (ref 3.5–5.2)
RBC # BLD AUTO: 2.36 MILL/MM3 (ref 4.7–6.1)
RH FACTOR: NORMAL
SCAN OF BLOOD SMEAR: NORMAL
SCHISTOCYTES BLD QL SMEAR: ABNORMAL
SEGMENTED NEUTROPHILS ABSOLUTE COUNT: 2.3 THOU/MM3 (ref 1.8–7.7)
SODIUM SERPL-SCNC: 136 MEQ/L (ref 135–145)
WBC # BLD AUTO: 3.9 THOU/MM3 (ref 4.8–10.8)

## 2023-11-03 PROCEDURE — 6360000002 HC RX W HCPCS: Performed by: PHYSICIAN ASSISTANT

## 2023-11-03 PROCEDURE — 6370000000 HC RX 637 (ALT 250 FOR IP): Performed by: PHYSICIAN ASSISTANT

## 2023-11-03 PROCEDURE — 86900 BLOOD TYPING SEROLOGIC ABO: CPT

## 2023-11-03 PROCEDURE — 93005 ELECTROCARDIOGRAM TRACING: CPT | Performed by: PHYSICIAN ASSISTANT

## 2023-11-03 PROCEDURE — 0QS606Z REPOSITION RIGHT UPPER FEMUR WITH INTRAMEDULLARY INTERNAL FIXATION DEVICE, OPEN APPROACH: ICD-10-PCS | Performed by: ORTHOPAEDIC SURGERY

## 2023-11-03 PROCEDURE — P9016 RBC LEUKOCYTES REDUCED: HCPCS

## 2023-11-03 PROCEDURE — 88305 TISSUE EXAM BY PATHOLOGIST: CPT

## 2023-11-03 PROCEDURE — 85018 HEMOGLOBIN: CPT

## 2023-11-03 PROCEDURE — 2580000003 HC RX 258: Performed by: PHYSICIAN ASSISTANT

## 2023-11-03 PROCEDURE — 73552 X-RAY EXAM OF FEMUR 2/>: CPT

## 2023-11-03 PROCEDURE — 2500000003 HC RX 250 WO HCPCS: Performed by: ORTHOPAEDIC SURGERY

## 2023-11-03 PROCEDURE — 86901 BLOOD TYPING SEROLOGIC RH(D): CPT

## 2023-11-03 PROCEDURE — 82948 REAGENT STRIP/BLOOD GLUCOSE: CPT

## 2023-11-03 PROCEDURE — 87205 SMEAR GRAM STAIN: CPT

## 2023-11-03 PROCEDURE — 88341 IMHCHEM/IMCYTCHM EA ADD ANTB: CPT

## 2023-11-03 PROCEDURE — 87070 CULTURE OTHR SPECIMN AEROBIC: CPT

## 2023-11-03 PROCEDURE — 93010 ELECTROCARDIOGRAM REPORT: CPT | Performed by: NUCLEAR MEDICINE

## 2023-11-03 PROCEDURE — 3700000001 HC ADD 15 MINUTES (ANESTHESIA): Performed by: ORTHOPAEDIC SURGERY

## 2023-11-03 PROCEDURE — 6360000002 HC RX W HCPCS: Performed by: NURSE ANESTHETIST, CERTIFIED REGISTERED

## 2023-11-03 PROCEDURE — 3600000014 HC SURGERY LEVEL 4 ADDTL 15MIN: Performed by: ORTHOPAEDIC SURGERY

## 2023-11-03 PROCEDURE — 86850 RBC ANTIBODY SCREEN: CPT

## 2023-11-03 PROCEDURE — 0QB60ZX EXCISION OF RIGHT UPPER FEMUR, OPEN APPROACH, DIAGNOSTIC: ICD-10-PCS | Performed by: ORTHOPAEDIC SURGERY

## 2023-11-03 PROCEDURE — 3600000004 HC SURGERY LEVEL 4 BASE: Performed by: ORTHOPAEDIC SURGERY

## 2023-11-03 PROCEDURE — 87075 CULTR BACTERIA EXCEPT BLOOD: CPT

## 2023-11-03 PROCEDURE — 3700000000 HC ANESTHESIA ATTENDED CARE: Performed by: ORTHOPAEDIC SURGERY

## 2023-11-03 PROCEDURE — 7100000001 HC PACU RECOVERY - ADDTL 15 MIN: Performed by: ORTHOPAEDIC SURGERY

## 2023-11-03 PROCEDURE — 88342 IMHCHEM/IMCYTCHM 1ST ANTB: CPT

## 2023-11-03 PROCEDURE — 7100000000 HC PACU RECOVERY - FIRST 15 MIN: Performed by: ORTHOPAEDIC SURGERY

## 2023-11-03 PROCEDURE — 1200000003 HC TELEMETRY R&B

## 2023-11-03 PROCEDURE — 2709999900 HC NON-CHARGEABLE SUPPLY: Performed by: ORTHOPAEDIC SURGERY

## 2023-11-03 PROCEDURE — 36430 TRANSFUSION BLD/BLD COMPNT: CPT

## 2023-11-03 PROCEDURE — 6360000002 HC RX W HCPCS: Performed by: ORTHOPAEDIC SURGERY

## 2023-11-03 PROCEDURE — 85610 PROTHROMBIN TIME: CPT

## 2023-11-03 PROCEDURE — 36415 COLL VENOUS BLD VENIPUNCTURE: CPT

## 2023-11-03 PROCEDURE — C1713 ANCHOR/SCREW BN/BN,TIS/BN: HCPCS | Performed by: ORTHOPAEDIC SURGERY

## 2023-11-03 PROCEDURE — 2580000003 HC RX 258: Performed by: ORTHOPAEDIC SURGERY

## 2023-11-03 PROCEDURE — 80048 BASIC METABOLIC PNL TOTAL CA: CPT

## 2023-11-03 PROCEDURE — 2720000010 HC SURG SUPPLY STERILE: Performed by: ORTHOPAEDIC SURGERY

## 2023-11-03 PROCEDURE — 85025 COMPLETE CBC W/AUTO DIFF WBC: CPT

## 2023-11-03 PROCEDURE — 85014 HEMATOCRIT: CPT

## 2023-11-03 PROCEDURE — A4216 STERILE WATER/SALINE, 10 ML: HCPCS | Performed by: ORTHOPAEDIC SURGERY

## 2023-11-03 PROCEDURE — 0QB63ZX EXCISION OF RIGHT UPPER FEMUR, PERCUTANEOUS APPROACH, DIAGNOSTIC: ICD-10-PCS | Performed by: ORTHOPAEDIC SURGERY

## 2023-11-03 PROCEDURE — 86923 COMPATIBILITY TEST ELECTRIC: CPT

## 2023-11-03 PROCEDURE — 99232 SBSQ HOSP IP/OBS MODERATE 35: CPT | Performed by: INTERNAL MEDICINE

## 2023-11-03 PROCEDURE — 0QS636Z REPOSITION RIGHT UPPER FEMUR WITH INTRAMEDULLARY INTERNAL FIXATION DEVICE, PERCUTANEOUS APPROACH: ICD-10-PCS | Performed by: ORTHOPAEDIC SURGERY

## 2023-11-03 DEVICE — INTERTAN SUBTROCHANTERIC LAG SCREW                                    11MM X 90MM
Type: IMPLANTABLE DEVICE | Site: HIP | Status: FUNCTIONAL
Brand: TRIGEN

## 2023-11-03 DEVICE — TRIGEN INTERTAN 10S 10MM X 42CM 125DEGREE RIGHT
Type: IMPLANTABLE DEVICE | Site: HIP | Status: FUNCTIONAL
Brand: TRIGEN

## 2023-11-03 DEVICE — TRIGEN LOW PROFILE SCREW 5.0MM X 42.5MM
Type: IMPLANTABLE DEVICE | Site: HIP | Status: FUNCTIONAL
Brand: TRIGEN

## 2023-11-03 RX ORDER — PROPOFOL 10 MG/ML
INJECTION, EMULSION INTRAVENOUS PRN
Status: DISCONTINUED | OUTPATIENT
Start: 2023-11-03 | End: 2023-11-03 | Stop reason: SDUPTHER

## 2023-11-03 RX ORDER — SODIUM CHLORIDE 9 MG/ML
INJECTION, SOLUTION INTRAVENOUS PRN
Status: DISCONTINUED | OUTPATIENT
Start: 2023-11-03 | End: 2023-11-10 | Stop reason: HOSPADM

## 2023-11-03 RX ORDER — ONDANSETRON 2 MG/ML
INJECTION INTRAMUSCULAR; INTRAVENOUS PRN
Status: DISCONTINUED | OUTPATIENT
Start: 2023-11-03 | End: 2023-11-03 | Stop reason: SDUPTHER

## 2023-11-03 RX ORDER — FENTANYL CITRATE 50 UG/ML
INJECTION, SOLUTION INTRAMUSCULAR; INTRAVENOUS PRN
Status: DISCONTINUED | OUTPATIENT
Start: 2023-11-03 | End: 2023-11-03 | Stop reason: SDUPTHER

## 2023-11-03 RX ORDER — ENOXAPARIN SODIUM 100 MG/ML
40 INJECTION SUBCUTANEOUS EVERY 24 HOURS
Status: DISCONTINUED | OUTPATIENT
Start: 2023-11-04 | End: 2023-11-08

## 2023-11-03 RX ORDER — PHENYLEPHRINE HCL IN 0.9% NACL 1 MG/10 ML
SYRINGE (ML) INTRAVENOUS PRN
Status: DISCONTINUED | OUTPATIENT
Start: 2023-11-03 | End: 2023-11-03 | Stop reason: SDUPTHER

## 2023-11-03 RX ORDER — LIDOCAINE HCL/PF 100 MG/5ML
SYRINGE (ML) INJECTION PRN
Status: DISCONTINUED | OUTPATIENT
Start: 2023-11-03 | End: 2023-11-03 | Stop reason: SDUPTHER

## 2023-11-03 RX ORDER — TRANEXAMIC ACID 100 MG/ML
INJECTION, SOLUTION INTRAVENOUS PRN
Status: DISCONTINUED | OUTPATIENT
Start: 2023-11-03 | End: 2023-11-03 | Stop reason: ALTCHOICE

## 2023-11-03 RX ADMIN — Medication 60 MG: at 10:10

## 2023-11-03 RX ADMIN — Medication 200 MCG: at 10:09

## 2023-11-03 RX ADMIN — Medication 3 MG: at 21:40

## 2023-11-03 RX ADMIN — Medication 2000 MG: at 10:07

## 2023-11-03 RX ADMIN — Medication 100 MCG: at 10:20

## 2023-11-03 RX ADMIN — FENTANYL CITRATE 100 MCG: 50 INJECTION, SOLUTION INTRAMUSCULAR; INTRAVENOUS at 10:28

## 2023-11-03 RX ADMIN — SODIUM CHLORIDE: 9 INJECTION, SOLUTION INTRAVENOUS at 00:58

## 2023-11-03 RX ADMIN — Medication 200 MCG: at 10:26

## 2023-11-03 RX ADMIN — ONDANSETRON 4 MG: 2 INJECTION INTRAMUSCULAR; INTRAVENOUS at 10:48

## 2023-11-03 RX ADMIN — HYDROCODONE BITARTRATE AND ACETAMINOPHEN 1 TABLET: 5; 325 TABLET ORAL at 21:40

## 2023-11-03 RX ADMIN — PROPOFOL 100 MG: 10 INJECTION, EMULSION INTRAVENOUS at 10:10

## 2023-11-03 RX ADMIN — HYDROCODONE BITARTRATE AND ACETAMINOPHEN 2 TABLET: 5; 325 TABLET ORAL at 06:39

## 2023-11-03 RX ADMIN — Medication 200 MCG: at 10:37

## 2023-11-03 RX ADMIN — Medication 200 MCG: at 10:31

## 2023-11-03 RX ADMIN — HYDROCODONE BITARTRATE AND ACETAMINOPHEN 2 TABLET: 5; 325 TABLET ORAL at 00:53

## 2023-11-03 RX ADMIN — Medication 200 MCG: at 10:46

## 2023-11-03 RX ADMIN — SODIUM CHLORIDE: 9 INJECTION, SOLUTION INTRAVENOUS at 21:44

## 2023-11-03 ASSESSMENT — PAIN DESCRIPTION - ORIENTATION
ORIENTATION: POSTERIOR
ORIENTATION: RIGHT

## 2023-11-03 ASSESSMENT — ENCOUNTER SYMPTOMS
GASTROINTESTINAL NEGATIVE: 1
EYES NEGATIVE: 1
ALLERGIC/IMMUNOLOGIC NEGATIVE: 1
RESPIRATORY NEGATIVE: 1

## 2023-11-03 ASSESSMENT — PAIN DESCRIPTION - LOCATION
LOCATION: HIP
LOCATION: COCCYX
LOCATION: HIP

## 2023-11-03 ASSESSMENT — PAIN DESCRIPTION - FREQUENCY
FREQUENCY: CONTINUOUS
FREQUENCY: INTERMITTENT

## 2023-11-03 ASSESSMENT — PAIN - FUNCTIONAL ASSESSMENT
PAIN_FUNCTIONAL_ASSESSMENT: PREVENTS OR INTERFERES WITH MANY ACTIVE NOT PASSIVE ACTIVITIES
PAIN_FUNCTIONAL_ASSESSMENT: PREVENTS OR INTERFERES SOME ACTIVE ACTIVITIES AND ADLS

## 2023-11-03 ASSESSMENT — PAIN DESCRIPTION - DESCRIPTORS
DESCRIPTORS: ACHING
DESCRIPTORS: ACHING
DESCRIPTORS: SHARP;TENDER
DESCRIPTORS: ACHING

## 2023-11-03 ASSESSMENT — PAIN SCALES - GENERAL
PAINLEVEL_OUTOF10: 10
PAINLEVEL_OUTOF10: 10
PAINLEVEL_OUTOF10: 5
PAINLEVEL_OUTOF10: 4
PAINLEVEL_OUTOF10: 4

## 2023-11-03 ASSESSMENT — PAIN DESCRIPTION - PAIN TYPE
TYPE: ACUTE PAIN
TYPE: SURGICAL PAIN

## 2023-11-03 ASSESSMENT — PAIN DESCRIPTION - ONSET: ONSET: ON-GOING

## 2023-11-03 NOTE — PROGRESS NOTES
[address] Wound Ostomy Continence Nurse  Progress Note       Josseline Souza  AGE: 81 y.o. GENDER: male  : 1935  UNIT: [id number]  TODAY'S DATE:  11/3/2023  ADMISSION DATE: 2023  7:28 PM    Summary:      Consult received for multiple wounds @ bilateral upper arms from injection sites getting infected. Documentation reviewed. There are no wounds documented. Recommend staff to document wounds then utilize Brenden and Wound Care order sets. See below. Continue to turn every 2 hours and PRN, offload with pillows, ensure patient is on low air loss support surface [employer], SPR+, [place], Bariatric bed), utilize moisture wicking underpads, limit use of depends, and if applicable, use waffle cushion when in chair. If wound evolves during hospital admission, please call. How to: [facility]den and Wound Care Orders         Type WOUND in order set search bar. Right click Wound Care Orders, select add to favorites. Right click GEN Brenden Scale Focused, select add to favorites. Check GEN Brenden Scale Focus and Wound Care Orders. Select appropriate orders. Sign orders as Per Protocol: No Cosign Required. These are independent nursing orders.

## 2023-11-03 NOTE — DISCHARGE INSTR - COC
Continuity of Care Form    Patient Name: Vasile Puga   :  1935  MRN:  [id number]    Admit date:  2023  Discharge date:  11/10/2023    Code Status Order: Full Code   Advance Directives:     Admitting Physician:  No admitting provider for patient encounter. PCP: BREN Aquino    Discharging Nurse: Ruy Paredes RN  Discharging Hospital Unit/Room#: [facility] OR ([facility] [address] [facility]  Discharging Unit Phone Number: [phone]    Emergency Contact:   Extended Emergency Contact Information  Primary Emergency Contact: Indigo [place] [address] [address] [place] Phone: [phone]  Relation: Child  Secondary Emergency Contact: Willy [place] [address] [place] Phone: [phone]  Relation: Brother/Sister    Past Surgical History:  Past Surgical History:   Procedure Laterality Date    CATARACT EXTRACTION      COLONOSCOPY      CT BONE MARROW BIOPSY  2022    CT BONE MARROW BIOPSY 2022 [facility] CT SCAN    TOTAL HIP ARTHROPLASTY         Immunization History:   Immunization History   Administered Date(s) Administered    COVID-19, MODERNA BLUE border, Primary or Immunocompromised, (age 12y+), IM, 100 mcg/0.5mL 2021, 2021, 2021       Active Problems:  Patient Active Problem List   Diagnosis Code    BPH (benign prostatic hyperplasia) N40.0    Hyperlipidemia E78.5    Hypertension I10    Easy bruising R23.3    TIA involving left internal carotid artery G45. 1    Chronic cerebral ischemia I67.82    Dementia without behavioral disturbance (HCC) F03.90    Balance problem R26.89    Cerebral atrophy (HCC) G31.9    Bilateral carotid artery stenosis Z90.36    Folic acid deficiency U53.7    Aortic stenosis, moderate I35.0    Closed right hip fracture, initial encounter ([address]) S72.001A    Myelodysplastic syndrome (HCC) D46.9       Isolation/Infection:   Isolation            No Isolation          Patient Infection Status       None to display            Nurse Assessment:  Last oxygen therapy. Ventilator:    - No ventilator support    Rehab Therapies: Physical Therapy and Occupational Therapy  Weight Bearing Status/Restrictions: No weight bearing restrictions  Other Medical Equipment (for information only, NOT a DME order):  wheelchair, cane, crutches, walker, bath bench, and bedside commode  Other Treatments: turn q2 and prn, triad cream to buttocks, offload whenever possible    Patient's personal belongings (please select all that are sent with patient):  Glasses    RN SIGNATURE:  Electronically signed by Arlin Mohamud RN on 11/10/23 at 7:56 AM EST    CASE MANAGEMENT/SOCIAL WORK SECTION    Inpatient Status Date: 11/02/2023    Readmission Risk Assessment Score:  Readmission Risk              Risk of Unplanned Readmission:  15           Discharging to Facility/ Agency   Name: Autumn Conteh  Address: [address] [place] [address]  [address]  Phone: [phone]  Fax: [phone]    Dialysis Facility (if applicable)   Name:  Address:  Dialysis Schedule:  Phone:  Fax:    / signature: Electronically signed by AJAY Mendoza on 11/9/23 at 2:57 PM EST    PHYSICIAN SECTION    Prognosis: Good    Condition at Discharge: Stable    Rehab Potential (if transferring to Rehab): Good      Physician Certification: I certify the above information and transfer of Tawanna Lubin  is necessary for the continuing treatment of the diagnosis listed and that he requires [address] for greater 30 days.      Update Admission H&P: No change in H&P    PHYSICIAN SIGNATURE:  Electronically signed by Moo Ken PA-C on 11/3/23 at 11:16 AM EDT

## 2023-11-03 NOTE — CARE COORDINATION
11/3/23, 2:09 PM EDT    DISCHARGE PLANNING EVALUATION    Consult for discharge plan, possible [facility] swing bed.    Will need PT/OT evals for referrals, discharge plan is ongoing

## 2023-11-03 NOTE — PROGRESS NOTES
COLONOSCOPY    PROCEDURE: Colonoscopy/ Biopsy  Polypectomy (Snare Cautery)    INDICATIONS: Rectal Pain    POST-OP DIAGNOSIS: See the impression below    SEDATION: Monitored anesthesia care, check anesthesia records    PHYSICAL EXAM:    /74   Pulse 70   Temp 98 1 °F (36 7 °C) (Temporal)   Resp 18   Ht 5' 7" (1 702 m)   Wt 57 6 kg (127 lb)   SpO2 100%   BMI 19 89 kg/m²   Body mass index is 19 89 kg/m²  General: NAD  Heart: S1 & S2 normal, RRR  Lungs: CTA, No rales or rhonchi  Abdomen: Soft, nontender, nondistended, good bowel sounds    CONSENT:  Informed consent was obtained for the procedure, including sedation after explaining the risks and benefits of the procedure  Risks including but not limited to bleeding, perforation, infection, aspiration were discussed in detail  Also explained about less than 100%$ sensitivity with the exam and other alternatives  PREPARATION:   EKG tracing, pulse oximetry, blood pressure were monitored throughout the procedure  Patient was identified by myself both verbally and by visual inspection of ID band  DESCRIPTION:   Patient was placed in the left lateral decubitus position and was sedated with the above medication  Digital rectal examination was performed  The colonoscope was introduced in to the anal canal and advanced up to cecum, which was identified by the appendiceal orifice and IC valve  A careful inspection was made as the colonoscope was withdrawn, including a retroflexed view of the rectum; findings and interventions are described below  Appropriate photodocumentation was obtained  The quality of the colonic preparation was adequate  FINDINGS:    1  Cecum and ileocecal valve-normal mucosa    2  Ascending colon-in the proximal part 8 mm sessile polyp was completely removed with snare cautery    3  Descending colon-7 mm depressed polyp was removed with snare cautery    4  Rectum and anal canal-normal mucosa  Biopsies were obtained    Small Returned from surgery per bed. internal hemorrhoids  IMPRESSIONS:      As above    RECOMMENDATIONS:    Check pathology    COMPLICATIONS:  None; patient tolerated the procedure well      DISPOSITION: PACU           CONDITION: Stable

## 2023-11-03 NOTE — PROGRESS NOTES
1059- pt to pacu, oral airway in place. Pt non responsive at this time. VSS. Pt appears in no acute distress. 1107- pt wakes, lifts head off pillow, oral airway removed. Assessment complete, pt denies pain, nausea at this time. 1114- pt resting in bed eyes closed. Vss.    1123- pt awake, continues to deny pain, nausea. vss    1127- report called to [address] [place] [address]    0588- Pt meets criteria for discharge from pacu, awaiting transport to [place]- pt awake, denies pain, nausea    1146- pt resting in bed eyes closed. Vss    1155- pt continues to rest in bed awake, denies pain. Awaiting transport.     [facility] arrives to return pt to [address] notified of pt return to [address] [facility] 24

## 2023-11-03 NOTE — OP NOTE
Operative Note      Patient: Tawanna Lubin  YOB: 1935  MRN: [id number]    Date of Procedure: 11/3/2023    Pre-Op diagnosis: Right subtrochanteric femur fracture    Post-Op Diagnosis: Right subtrochanteric femur fracture pathologic in nature lesion visualized       Procedure: Open treatment right subtrochanteric femur fracture with a cephalomedullary nail, biopsy right proximal femur    Surgeon(s):  Kayley Colindres MD    Assistant:   Physician Assistant: Guille Amanda    The physician assistant was present for the entirety of the procedure and vital for the performance of the procedure. He assisted with positioning, prepping, draping of the patient before the procedure and instrument manipulation, extremity repositioning  as well as wound closure, dressing application after the procedure. Please note that no intern, resident, or other hospital staff was available to assist during the surgery     Anesthesia: General    Estimated Blood Loss (mL): 666     Complications: None    Specimens:   ID Type Source Tests Collected by Time Destination   1 : Right hip femoral reamings  Tissue Joint, Hip CULTURE, ANAEROBIC AND AEROBIC Brynn Villegas MD 11/3/2023 1037    A : RIght hip femoral reamings #1  Tissue Joint, Hip SURGICAL PATHOLOGY Kayley Colindres MD 11/3/2023 1039    B : Right hip femoral reamings #2  Tissue Joint, Hip SURGICAL PATHOLOGY Kayley Colindres MD 11/3/2023 1040        Implants:  Implant Name Type Inv.  Item Serial No.  Lot No. LRB No. Used Action   NAIL IM 10S L42CM [id number] 125DEG RT ORTHOAPEDIC RECON - [id number]  NAIL IM 10S L42CM [id number] 125DEG RT [facility] ORTHOPAEDICS- [id number] Right 1 Implanted   SCREW BONE L90MM [id number] CANC SUBTROCHANTERIC TI JENNIE LAG - [id number]  SCREW BONE L90MM [id number] CANC SUBTROCHANTERIC TI JENNIE LAG  [employer]THOP[employer]-WD [id number] Right 1 Implanted   SCREW BNE L42.5MM [id number] ROB TIB G TI ST SELF

## 2023-11-03 NOTE — CONSULTS
Orthopaedic Consult  Patient:  Carlton Desai  YOB: 1935  MRN: [id number]     Acct: [de-identified]    PCP: BREN Shell  Date of Admission: 11/2/2023  Date of Service: Pt seen/examined on 11/3/2023     Chief Complaint: right hip pain  History Of Present Illness: 81 y.o. male who presents with right hip pain. Patient has had pain for about 2 weeks relegating him to cane use, but sustained a mechanical fall yesterday in his home, unsure if hit head, no loc. Immediate RLE pain and inability to ambulate. Head and neck imaging in the ED negative for acute process, did reveal a R IT femur fracture. Pain is to the right groin, worsened with weight bearing, ROM, relieved by immobilization, no paresthesias, no weakness. No other msk complaints at this time. Lives independently, performs all ADLs, drives  ASA  Cane for ambulation for the last 2 weeks secondary to right hip pain, otherwise   Denies any recent chest pain, sob, no progressive weakness beyond right hip pain    Past Medical History:        Diagnosis Date    Anemia     Aortic stenosis     Balanoposthitis     BPH (benign prostatic hyperplasia)     Cardiac murmur     Chronic pain of left knee     DM (diabetes mellitus) ([address])     Hearing loss     Hyperlipidemia     Hypertension     Thrombocytopenia ([address])        Past Surgical History:        Procedure Laterality Date    CATARACT EXTRACTION  2020    COLONOSCOPY      CT BONE MARROW BIOPSY  6/17/2022    CT BONE MARROW BIOPSY 6/17/2022 [facility] CT SCAN    TOTAL HIP ARTHROPLASTY         Home Medications:   Prior to Admission medications    Medication Sig Start Date End Date Taking?  Authorizing Provider   tamsulosin (FLOMAX) 0.4 MG capsule Take 1 capsule by mouth daily 2/28/23 5/10/24  Pola Ambriz MD   folic acid (FOLVITE) 1 MG tablet Take 1 tablet by mouth daily    Pola Ambriz MD   Cyanocobalamin (VITAMIN B 12 PO) Take by mouth    Pola Ambriz MD   aspirin 81 MG tablet

## 2023-11-03 NOTE — CARE COORDINATION
Case Management Assessment  Initial Evaluation    Date/Time of Evaluation: 11/3/2023 3:03 PM  Assessment Completed by: Ana Lilia Ayon RN    If patient is discharged prior to next notation, then this note serves as note for discharge by case management. Patient Name: Carlton Desai                   YOB: 1935  Diagnosis: Closed right hip fracture, initial encounter [facility]) [place]                   Date / Time: 11/2/2023  7:28 PM  Location: [address] [place] [address]     Patient Admission Status: Inpatient   Readmission Risk Low 0-14, Mod 15-19), High > 20: Readmission Risk Score: 14.4    Current PCP: BREN Shell  PCP verified by CM? Yes    Chart Reviewed: Yes      History Provided by: Patient  Patient Orientation: Alert and Oriented    Patient Cognition: Alert    Hospitalization in the last 30 days (Readmission):  No    If yes, Readmission Assessment in CM Navigator will be completed. Advance Directives:      Code Status: Full Code   Patient's Primary Decision Maker is: Named in [address] [place] [address] (has living will   reminded to bring copy)      Discharge Planning:    Patient lives with: Alone Type of Home: House  Primary Care Giver: Self  Patient Support Systems include: Children   Current Financial resources: Medicare  Current community resources: None  Current services prior to admission: Durable Medical Equipment            Current DME: Agustin Hinds            Type of Home Care services:  None    ADLS  Prior functional level: Independent in ADLs/IADLs  Current functional level: Assistance with the following:, Mobility, Bathing, Dressing, [address], Shopping    Family can provide assistance at DC: Yes  Would you like Case Management to discuss the discharge plan with any other family members/significant others, and if so, who?  No  Plans to Return to Present Housing: Unknown at present  Other Identified Issues/Barriers to RETURNING to current housing: unsure  Potential Assistance needed at

## 2023-11-04 ENCOUNTER — APPOINTMENT (OUTPATIENT)
Dept: GENERAL RADIOLOGY | Age: 88
End: 2023-11-04
Payer: MEDICARE

## 2023-11-04 LAB
ANION GAP SERPL CALC-SCNC: 8 MEQ/L (ref 8–16)
ANISOCYTOSIS BLD QL SMEAR: PRESENT
BASOPHILS ABSOLUTE: 0 THOU/MM3 (ref 0–0.1)
BASOPHILS NFR BLD AUTO: 0.6 %
BUN SERPL-MCNC: 25 MG/DL (ref 7–22)
CALCIUM SERPL-MCNC: 7.7 MG/DL (ref 8.5–10.5)
CHLORIDE SERPL-SCNC: 107 MEQ/L (ref 98–111)
CO2 SERPL-SCNC: 22 MEQ/L (ref 23–33)
CREAT SERPL-MCNC: 1.4 MG/DL (ref 0.4–1.2)
DEPRECATED RDW RBC AUTO: 70.5 FL (ref 35–45)
EOSINOPHIL NFR BLD AUTO: 3 %
EOSINOPHILS ABSOLUTE: 0.2 THOU/MM3 (ref 0–0.4)
ERYTHROCYTE [DISTWIDTH] IN BLOOD BY AUTOMATED COUNT: 20.3 % (ref 11.5–14.5)
GFR SERPL CREATININE-BSD FRML MDRD: 48 ML/MIN/1.73M2
GLUCOSE SERPL-MCNC: 106 MG/DL (ref 70–108)
HCT VFR BLD AUTO: 22.8 % (ref 42–52)
HCT VFR BLD AUTO: 28.6 % (ref 42–52)
HGB BLD-MCNC: 6.8 GM/DL (ref 14–18)
HGB BLD-MCNC: 8.8 GM/DL (ref 14–18)
IMM GRANULOCYTES # BLD AUTO: 0.12 THOU/MM3 (ref 0–0.07)
IMM GRANULOCYTES NFR BLD AUTO: 2.4 %
LYMPHOCYTES ABSOLUTE: 0.6 THOU/MM3 (ref 1–4.8)
LYMPHOCYTES NFR BLD AUTO: 12.5 %
MAGNESIUM SERPL-MCNC: 2.1 MG/DL (ref 1.6–2.4)
MCH RBC QN AUTO: 28.3 PG (ref 26–33)
MCHC RBC AUTO-ENTMCNC: 29.8 GM/DL (ref 32.2–35.5)
MCV RBC AUTO: 95 FL (ref 80–94)
MONOCYTES ABSOLUTE: 0.7 THOU/MM3 (ref 0.4–1.3)
MONOCYTES NFR BLD AUTO: 13.7 %
NEUTROPHILS NFR BLD AUTO: 67.8 %
NRBC BLD AUTO-RTO: 0 /100 WBC
PLATELET # BLD AUTO: 91 THOU/MM3 (ref 130–400)
PMV BLD AUTO: 9.6 FL (ref 9.4–12.4)
POTASSIUM SERPL-SCNC: 4.6 MEQ/L (ref 3.5–5.2)
RBC # BLD AUTO: 2.4 MILL/MM3 (ref 4.7–6.1)
SEGMENTED NEUTROPHILS ABSOLUTE COUNT: 3.4 THOU/MM3 (ref 1.8–7.7)
SODIUM SERPL-SCNC: 137 MEQ/L (ref 135–145)
WBC # BLD AUTO: 5 THOU/MM3 (ref 4.8–10.8)

## 2023-11-04 PROCEDURE — 2580000003 HC RX 258: Performed by: INTERNAL MEDICINE

## 2023-11-04 PROCEDURE — 99232 SBSQ HOSP IP/OBS MODERATE 35: CPT | Performed by: INTERNAL MEDICINE

## 2023-11-04 PROCEDURE — 85014 HEMATOCRIT: CPT

## 2023-11-04 PROCEDURE — 83735 ASSAY OF MAGNESIUM: CPT

## 2023-11-04 PROCEDURE — 1200000003 HC TELEMETRY R&B

## 2023-11-04 PROCEDURE — 6370000000 HC RX 637 (ALT 250 FOR IP): Performed by: PHYSICIAN ASSISTANT

## 2023-11-04 PROCEDURE — 2580000003 HC RX 258: Performed by: PHYSICIAN ASSISTANT

## 2023-11-04 PROCEDURE — 73552 X-RAY EXAM OF FEMUR 2/>: CPT

## 2023-11-04 PROCEDURE — 97162 PT EVAL MOD COMPLEX 30 MIN: CPT

## 2023-11-04 PROCEDURE — 97530 THERAPEUTIC ACTIVITIES: CPT

## 2023-11-04 PROCEDURE — 80048 BASIC METABOLIC PNL TOTAL CA: CPT

## 2023-11-04 PROCEDURE — 73560 X-RAY EXAM OF KNEE 1 OR 2: CPT

## 2023-11-04 PROCEDURE — 6360000002 HC RX W HCPCS: Performed by: INTERNAL MEDICINE

## 2023-11-04 PROCEDURE — 85025 COMPLETE CBC W/AUTO DIFF WBC: CPT

## 2023-11-04 PROCEDURE — 36415 COLL VENOUS BLD VENIPUNCTURE: CPT

## 2023-11-04 PROCEDURE — 36430 TRANSFUSION BLD/BLD COMPNT: CPT

## 2023-11-04 PROCEDURE — 85018 HEMOGLOBIN: CPT

## 2023-11-04 RX ORDER — SODIUM CHLORIDE 9 MG/ML
INJECTION, SOLUTION INTRAVENOUS PRN
Status: DISCONTINUED | OUTPATIENT
Start: 2023-11-04 | End: 2023-11-10 | Stop reason: HOSPADM

## 2023-11-04 RX ORDER — 0.9 % SODIUM CHLORIDE 0.9 %
500 INTRAVENOUS SOLUTION INTRAVENOUS ONCE
Status: COMPLETED | OUTPATIENT
Start: 2023-11-04 | End: 2023-11-04

## 2023-11-04 RX ADMIN — FOLIC ACID 1 MG: 1 TABLET ORAL at 10:26

## 2023-11-04 RX ADMIN — HYDROMORPHONE HYDROCHLORIDE 0.5 MG: 1 INJECTION, SOLUTION INTRAMUSCULAR; INTRAVENOUS; SUBCUTANEOUS at 13:00

## 2023-11-04 RX ADMIN — HYDROCODONE BITARTRATE AND ACETAMINOPHEN 2 TABLET: 5; 325 TABLET ORAL at 20:24

## 2023-11-04 RX ADMIN — Medication 3 MG: at 20:24

## 2023-11-04 RX ADMIN — SODIUM CHLORIDE: 9 INJECTION, SOLUTION INTRAVENOUS at 15:12

## 2023-11-04 RX ADMIN — ACETAMINOPHEN 650 MG: 325 TABLET ORAL at 03:48

## 2023-11-04 RX ADMIN — SODIUM CHLORIDE 500 ML: 9 INJECTION, SOLUTION INTRAVENOUS at 20:26

## 2023-11-04 RX ADMIN — PRAVASTATIN SODIUM 40 MG: 40 TABLET ORAL at 10:26

## 2023-11-04 RX ADMIN — LISINOPRIL 10 MG: 10 TABLET ORAL at 10:26

## 2023-11-04 RX ADMIN — SODIUM CHLORIDE, PRESERVATIVE FREE 10 ML: 5 INJECTION INTRAVENOUS at 20:26

## 2023-11-04 RX ADMIN — FINASTERIDE 5 MG: 5 TABLET, FILM COATED ORAL at 10:26

## 2023-11-04 RX ADMIN — SODIUM CHLORIDE 500 ML: 9 INJECTION, SOLUTION INTRAVENOUS at 17:24

## 2023-11-04 RX ADMIN — TAMSULOSIN HYDROCHLORIDE 0.4 MG: 0.4 CAPSULE ORAL at 10:26

## 2023-11-04 ASSESSMENT — PAIN DESCRIPTION - PAIN TYPE: TYPE: SURGICAL PAIN

## 2023-11-04 ASSESSMENT — PAIN DESCRIPTION - ONSET: ONSET: ON-GOING

## 2023-11-04 ASSESSMENT — PAIN SCALES - GENERAL
PAINLEVEL_OUTOF10: 7
PAINLEVEL_OUTOF10: 10
PAINLEVEL_OUTOF10: 4
PAINLEVEL_OUTOF10: 3
PAINLEVEL_OUTOF10: 7

## 2023-11-04 ASSESSMENT — PAIN DESCRIPTION - LOCATION
LOCATION: LEG
LOCATION: COCCYX
LOCATION_2: COCCYX
LOCATION: LEG

## 2023-11-04 ASSESSMENT — PAIN DESCRIPTION - FREQUENCY: FREQUENCY: CONTINUOUS

## 2023-11-04 ASSESSMENT — PAIN - FUNCTIONAL ASSESSMENT
PAIN_FUNCTIONAL_ASSESSMENT_SITE2: PREVENTS OR INTERFERES SOME ACTIVE ACTIVITIES AND ADLS
PAIN_FUNCTIONAL_ASSESSMENT: PREVENTS OR INTERFERES SOME ACTIVE ACTIVITIES AND ADLS

## 2023-11-04 ASSESSMENT — PAIN DESCRIPTION - ORIENTATION
ORIENTATION: RIGHT
ORIENTATION_2: POSTERIOR
ORIENTATION: RIGHT

## 2023-11-04 ASSESSMENT — PAIN DESCRIPTION - DESCRIPTORS
DESCRIPTORS_2: TENDER;ACHING
DESCRIPTORS: ACHING;TENDER

## 2023-11-04 ASSESSMENT — PAIN DESCRIPTION - INTENSITY: RATING_2: 6

## 2023-11-04 NOTE — ANESTHESIA POSTPROCEDURE EVALUATION
Department of Anesthesiology  Postprocedure Note    Patient: Trever Garcia  MRN: [id number]  YOB: 1935  Date of evaluation: 11/4/2023      Procedure Summary     Date: 11/03/23 Room / Location: [facility] / Sophie Shaila    Anesthesia Start: 1007 Anesthesia Stop: 1103    Procedure: RIGHT INTERTAN (Right: Hip) Diagnosis:       Closed fracture of right hip, initial encounter ([address])      (Closed fracture of right hip, initial encounter ([address]) [S72.001A])    Surgeons: Brandi Avitia MD Responsible Provider: Maciel Sparks DO    Anesthesia Type: general ASA Status: 4          Anesthesia Type: No value filed.     Geni Phase I: Geni Score: 10    Geni Phase II:        Anesthesia Post Evaluation    Patient location during evaluation: PACU  Patient participation: complete - patient participated  Level of consciousness: awake  Airway patency: patent  Nausea & Vomiting: no nausea  Complications: no  Cardiovascular status: hemodynamically stable  Respiratory status: acceptable  Hydration status: stable  Pain management: adequate

## 2023-11-04 NOTE — PLAN OF CARE
Problem: Discharge Planning  Goal: Discharge to home or other facility with appropriate resources  Outcome: Progressing  Flowsheets (Taken 11/4/2023 0144)  Discharge to home or other facility with appropriate resources:   Arrange for needed discharge resources and transportation as appropriate   Identify barriers to discharge with patient and caregiver   Identify discharge learning needs (meds, wound care, etc)     Problem: Pain  Goal: Verbalizes/displays adequate comfort level or baseline comfort level  Outcome: Progressing  Flowsheets (Taken 11/4/2023 0144)  Verbalizes/displays adequate comfort level or baseline comfort level:   Encourage patient to monitor pain and request assistance   Administer analgesics based on type and severity of pain and evaluate response   Assess pain using appropriate pain scale   Implement non-pharmacological measures as appropriate and evaluate response     Problem: Safety - Adult  Goal: Free from fall injury  Outcome: Progressing  Flowsheets  Taken 11/4/2023 0144  Free From Fall Injury: Instruct family/caregiver on patient safety  Taken 11/4/2023 0142  Free From Fall Injury: Instruct family/caregiver on patient safety     Problem: ABCDS Injury Assessment  Goal: Absence of physical injury  Outcome: Progressing  Flowsheets  Taken 11/4/2023 0144  Absence of Physical Injury: Implement safety measures based on patient assessment  Taken 11/4/2023 0142  Absence of Physical Injury: Implement safety measures based on patient assessment     Problem: Chronic Conditions and Co-morbidities  Goal: Patient's chronic conditions and co-morbidity symptoms are monitored and maintained or improved  Outcome: Progressing  Flowsheets (Taken 11/4/2023 0144)  Care Plan - Patient's Chronic Conditions and Co-Morbidity Symptoms are Monitored and Maintained or Improved:   Monitor and assess patient's chronic conditions and comorbid symptoms for stability, deterioration, or improvement   Collaborate with multidisciplinary team to address chronic and comorbid conditions and prevent exacerbation or deterioration   Update acute care plan with appropriate goals if chronic or comorbid symptoms are exacerbated and prevent overall improvement and discharge     Problem: Skin/Tissue Integrity - Adult  Goal: Skin integrity remains intact  Outcome: Progressing  Flowsheets (Taken 11/4/2023 0144)  Skin Integrity Remains Intact:   Monitor for areas of redness and/or skin breakdown   Assess vascular access sites hourly     Problem: Musculoskeletal - Adult  Goal: Return mobility to safest level of function  Outcome: Progressing  Flowsheets (Taken 11/4/2023 0144)  Return Mobility to Safest Level of Function:   Assess patient stability and activity tolerance for standing, transferring and ambulating with or without assistive devices   Assist with transfers and ambulation using safe patient handling equipment as needed   Ensure adequate protection for wounds/incisions during mobilization   Obtain physical therapy/occupational therapy consults as needed     Problem: Gastrointestinal - Adult  Goal: Maintains or returns to baseline bowel function  Outcome: Progressing  Flowsheets (Taken 11/4/2023 0144)  Maintains or returns to baseline bowel function:   Assess bowel function   Encourage mobilization and activity   Administer IV fluids as ordered to ensure adequate hydration   Encourage oral fluids to ensure adequate hydration   Administer ordered medications as needed     Problem: Genitourinary - Adult  Goal: Urinary catheter remains patent  Outcome: Progressing  Flowsheets (Taken 11/4/2023 0144)  Urinary catheter remains patent: Assess patency of urinary catheter     Problem: Infection - Adult  Goal: Absence of infection at discharge  Outcome: Progressing  Flowsheets (Taken 11/4/2023 0144)  Absence of infection at discharge:   Assess and monitor for signs and symptoms of infection   Monitor lab/diagnostic results   Monitor all insertion

## 2023-11-04 NOTE — PROGRESS NOTES
[address]   INPATIENT PHYSICAL THERAPY  EVALUATION  [facility] ORTHOPEDICS 7K - 1C-15/651-Y    Time In: 3454  Time Out: 0732  Timed Code Treatment Minutes: 25 Minutes  Minutes: 35          Date: 2023  Patient Name: Elidia Randolph,  Gender:  male        MRN: [id number]  : 1935  (81 y.o.)      Referring Practitioner: Agustin Park.HINA  Diagnosis: Closed Right hip fracture; initial encouter  Additional Pertinent Hx: Per H&P \"88 y.o. male who presents with right hip pain. Patient has had pain for about 2 weeks relegating him to cane use, but sustained a mechanical fall yesterday in his home, unsure if hit head, no loc. Immediate RLE pain and inability to ambulate. Head and neck imaging in the ED negative for acute process, did reveal a R IT femur fracture. Pain is to the right groin, worsened with weight bearing, ROM, relieved by immobilization, no paresthesias, no weakness. No other msk complaints at this time. \" Patient underwent Open treatment right subtrochanteric femur fracture with a cephalomedullary nail, biopsy right proximal femur on 11/3/23 and placed as WBAT on RLE     Restrictions/Precautions:  Restrictions/Precautions: General Precautions, Fall Risk  Position Activity Restriction  Other position/activity restrictions: WBAT RLE    Subjective:  Chart Reviewed: Yes  Patient assessed for rehabilitation services?: Yes  Family / Caregiver Present: No  Subjective: RN ok'd PT session. Patient laying in bed upon arrival. Patient pleasant and agreeable to therapy. Patient found to have increased RLE hip IR in supine and in sitting with complaints of pain with movement. PT notified RN of excessive hip IR and RN in to check patient at end of session. Therefore limited with therapy session due to concerns for RLE.     General:     Vision: Impaired  Vision Exceptions: Wears glasses for reading  Hearing: Exceptions to [facility]  Hearing Exceptions: Hard of hearing/hearing concerns       Pain: of bed with cueing for hand placement. Patient had increased RLE IR with decreased ability to correct. Once in standing patient reporting \"something is not right\" and PT assisted patient back into supine in bed. Ambulation:  Not Tested- Unable due to safety concerns will assess when able      Functional Outcome Measures: Completed  AM-PAC Inpatient Mobility without Stair Climbing Raw Score : 9  AM-PAC Inpatient without Stair Climbing T-Scale Score : 32.44  Modified Estrella Scale:  +4 - Moderately severe disability; unable to walk and attend to bodily needs without assistance    ASSESSMENT:  Activity Tolerance:  Patient tolerance of  treatment: fair. Limited due to pain     Treatment Initiated: Treatment and education initiated within context of evaluation. Evaluation time included review of current medical information, gathering information related to past medical, social and functional history, completion of standardized testing, formal and informal observation of tasks, assessment of data and development of plan of care and goals. Treatment time included skilled education and facilitation of tasks to increase safety and independence with functional mobility for improved independence and quality of life. Assessment: Body Structures, Functions, Activity Limitations Requiring Skilled Therapeutic Intervention: Decreased functional mobility , Decreased strength, Increased pain, Decreased posture, Decreased ROM, Decreased body mechanics, Decreased balance, Decreased endurance  Assessment: Patient is a 81 y.o male who presents s/p fall resulting in R femur fracture which has resulted in a decline in functional mobility. Patient limited with therapy evaluation due to excessive RLE IR in supine and seated position with increased complaints of pain. Patient requires moderate to maximal assistance for bed mobility and minimal to moderate assistance for sit<>stand transfers.  Patient overall can benefit from skilled

## 2023-11-04 NOTE — PROGRESS NOTES
[address] [facility] THERAPY MISSED TREATMENT NOTE  [facility] ORTHOPEDICS 7K  7J-69/216-M      Date: 2023  Patient Name: Cheyanne Carolina        CSN: [id number]   : 1935  (81 y.o.)  Gender: male                REASON FOR MISSED TREATMENT: Patient Unable to Participate. Patient's hemoglobin currently 6.8 and patient receiving 1 unit of blood. Plan to check back later today as patient able to participate.

## 2023-11-05 LAB
ANION GAP SERPL CALC-SCNC: 7 MEQ/L (ref 8–16)
BASOPHILS ABSOLUTE: 0 THOU/MM3 (ref 0–0.1)
BASOPHILS NFR BLD AUTO: 0.6 %
BUN SERPL-MCNC: 20 MG/DL (ref 7–22)
CALCIUM SERPL-MCNC: 7.8 MG/DL (ref 8.5–10.5)
CHLORIDE SERPL-SCNC: 108 MEQ/L (ref 98–111)
CO2 SERPL-SCNC: 22 MEQ/L (ref 23–33)
CREAT SERPL-MCNC: 1.1 MG/DL (ref 0.4–1.2)
DEPRECATED RDW RBC AUTO: 63.1 FL (ref 35–45)
EOSINOPHIL NFR BLD AUTO: 4.2 %
EOSINOPHILS ABSOLUTE: 0.2 THOU/MM3 (ref 0–0.4)
ERYTHROCYTE [DISTWIDTH] IN BLOOD BY AUTOMATED COUNT: 18.7 % (ref 11.5–14.5)
GFR SERPL CREATININE-BSD FRML MDRD: > 60 ML/MIN/1.73M2
GLUCOSE SERPL-MCNC: 132 MG/DL (ref 70–108)
HCT VFR BLD AUTO: 24.1 % (ref 42–52)
HCT VFR BLD AUTO: 28.8 % (ref 42–52)
HGB BLD-MCNC: 7.3 GM/DL (ref 14–18)
HGB BLD-MCNC: 9 GM/DL (ref 14–18)
IMM GRANULOCYTES # BLD AUTO: 0.12 THOU/MM3 (ref 0–0.07)
IMM GRANULOCYTES NFR BLD AUTO: 2.4 %
LYMPHOCYTES ABSOLUTE: 0.4 THOU/MM3 (ref 1–4.8)
LYMPHOCYTES NFR BLD AUTO: 8.7 %
MAGNESIUM SERPL-MCNC: 2.2 MG/DL (ref 1.6–2.4)
MCH RBC QN AUTO: 29.1 PG (ref 26–33)
MCHC RBC AUTO-ENTMCNC: 31.3 GM/DL (ref 32.2–35.5)
MCV RBC AUTO: 93.2 FL (ref 80–94)
MONOCYTES ABSOLUTE: 0.6 THOU/MM3 (ref 0.4–1.3)
MONOCYTES NFR BLD AUTO: 12.1 %
NEUTROPHILS NFR BLD AUTO: 72 %
NRBC BLD AUTO-RTO: 0 /100 WBC
PLATELET # BLD AUTO: 76 THOU/MM3 (ref 130–400)
PMV BLD AUTO: 9.7 FL (ref 9.4–12.4)
POTASSIUM SERPL-SCNC: 4.1 MEQ/L (ref 3.5–5.2)
RBC # BLD AUTO: 3.09 MILL/MM3 (ref 4.7–6.1)
SEGMENTED NEUTROPHILS ABSOLUTE COUNT: 3.6 THOU/MM3 (ref 1.8–7.7)
SODIUM SERPL-SCNC: 137 MEQ/L (ref 135–145)
WBC # BLD AUTO: 5 THOU/MM3 (ref 4.8–10.8)

## 2023-11-05 PROCEDURE — 6370000000 HC RX 637 (ALT 250 FOR IP): Performed by: PHYSICIAN ASSISTANT

## 2023-11-05 PROCEDURE — 36415 COLL VENOUS BLD VENIPUNCTURE: CPT

## 2023-11-05 PROCEDURE — 85018 HEMOGLOBIN: CPT

## 2023-11-05 PROCEDURE — 85014 HEMATOCRIT: CPT

## 2023-11-05 PROCEDURE — 80048 BASIC METABOLIC PNL TOTAL CA: CPT

## 2023-11-05 PROCEDURE — 97530 THERAPEUTIC ACTIVITIES: CPT

## 2023-11-05 PROCEDURE — 85025 COMPLETE CBC W/AUTO DIFF WBC: CPT

## 2023-11-05 PROCEDURE — 36430 TRANSFUSION BLD/BLD COMPNT: CPT

## 2023-11-05 PROCEDURE — 99232 SBSQ HOSP IP/OBS MODERATE 35: CPT | Performed by: INTERNAL MEDICINE

## 2023-11-05 PROCEDURE — 83735 ASSAY OF MAGNESIUM: CPT

## 2023-11-05 PROCEDURE — 2580000003 HC RX 258: Performed by: PHYSICIAN ASSISTANT

## 2023-11-05 PROCEDURE — 1200000003 HC TELEMETRY R&B

## 2023-11-05 PROCEDURE — 97166 OT EVAL MOD COMPLEX 45 MIN: CPT

## 2023-11-05 RX ORDER — 0.9 % SODIUM CHLORIDE 0.9 %
1000 INTRAVENOUS SOLUTION INTRAVENOUS ONCE
Status: COMPLETED | OUTPATIENT
Start: 2023-11-05 | End: 2023-11-05

## 2023-11-05 RX ORDER — SODIUM CHLORIDE 1 G/1
1 TABLET ORAL ONCE
Status: COMPLETED | OUTPATIENT
Start: 2023-11-05 | End: 2023-11-05

## 2023-11-05 RX ORDER — SODIUM CHLORIDE 9 MG/ML
INJECTION, SOLUTION INTRAVENOUS PRN
Status: DISCONTINUED | OUTPATIENT
Start: 2023-11-05 | End: 2023-11-10 | Stop reason: HOSPADM

## 2023-11-05 RX ADMIN — POLYETHYLENE GLYCOL 3350 17 G: 17 POWDER, FOR SOLUTION ORAL at 13:35

## 2023-11-05 RX ADMIN — SODIUM CHLORIDE, PRESERVATIVE FREE 10 ML: 5 INJECTION INTRAVENOUS at 20:21

## 2023-11-05 RX ADMIN — HYDROCODONE BITARTRATE AND ACETAMINOPHEN 2 TABLET: 5; 325 TABLET ORAL at 13:35

## 2023-11-05 RX ADMIN — TAMSULOSIN HYDROCHLORIDE 0.4 MG: 0.4 CAPSULE ORAL at 09:13

## 2023-11-05 RX ADMIN — Medication 3 MG: at 20:21

## 2023-11-05 RX ADMIN — FINASTERIDE 5 MG: 5 TABLET, FILM COATED ORAL at 09:13

## 2023-11-05 RX ADMIN — PRAVASTATIN SODIUM 40 MG: 40 TABLET ORAL at 09:13

## 2023-11-05 RX ADMIN — ACETAMINOPHEN 650 MG: 325 TABLET ORAL at 01:35

## 2023-11-05 RX ADMIN — HYDROCODONE BITARTRATE AND ACETAMINOPHEN 2 TABLET: 5; 325 TABLET ORAL at 20:21

## 2023-11-05 RX ADMIN — HYDROCODONE BITARTRATE AND ACETAMINOPHEN 2 TABLET: 5; 325 TABLET ORAL at 09:13

## 2023-11-05 RX ADMIN — SODIUM CHLORIDE 1 G: 1 TABLET ORAL at 01:01

## 2023-11-05 RX ADMIN — SODIUM CHLORIDE 1000 ML: 9 INJECTION, SOLUTION INTRAVENOUS at 00:24

## 2023-11-05 RX ADMIN — SODIUM CHLORIDE, PRESERVATIVE FREE 10 ML: 5 INJECTION INTRAVENOUS at 09:13

## 2023-11-05 RX ADMIN — FOLIC ACID 1 MG: 1 TABLET ORAL at 09:13

## 2023-11-05 ASSESSMENT — PAIN - FUNCTIONAL ASSESSMENT: PAIN_FUNCTIONAL_ASSESSMENT: PREVENTS OR INTERFERES SOME ACTIVE ACTIVITIES AND ADLS

## 2023-11-05 ASSESSMENT — PAIN DESCRIPTION - ONSET: ONSET: ON-GOING

## 2023-11-05 ASSESSMENT — PAIN DESCRIPTION - LOCATION
LOCATION: HIP;KNEE
LOCATION: LEG
LOCATION: HIP

## 2023-11-05 ASSESSMENT — PAIN SCALES - GENERAL
PAINLEVEL_OUTOF10: 8
PAINLEVEL_OUTOF10: 3
PAINLEVEL_OUTOF10: 7
PAINLEVEL_OUTOF10: 7

## 2023-11-05 ASSESSMENT — PAIN DESCRIPTION - ORIENTATION
ORIENTATION: RIGHT

## 2023-11-05 ASSESSMENT — PAIN DESCRIPTION - FREQUENCY: FREQUENCY: CONTINUOUS

## 2023-11-05 ASSESSMENT — PAIN DESCRIPTION - PAIN TYPE: TYPE: ACUTE PAIN;SURGICAL PAIN

## 2023-11-05 NOTE — PROGRESS NOTES
[facility] OCCUPATIONAL THERAPY  [facility] ORTHOPEDICS 7K  EVALUATION    Time:    Time In: 820  Time Out: 3667  Timed Code Treatment Minutes: 9 Minutes  Minutes: 24          Date: 2023  Patient Name: Roge Conner,   Gender: male      MRN: [id number]  : 1935  (81 y.o.)  Referring Practitioner: Shana Cannon., HINA  Diagnosis: Closed right hip fracture, initial encounter  Additional Pertinent Hx: Per H&P \"88 y.o. male who presents with right hip pain. Patient has had pain for about 2 weeks relegating him to cane use, but sustained a mechanical fall yesterday in his home, unsure if hit head, no loc. Immediate RLE pain and inability to ambulate. Head and neck imaging in the ED negative for acute process, did reveal a R IT femur fracture. Pain is to the right groin, worsened with weight bearing, ROM, relieved by immobilization, no paresthesias, no weakness. No other msk complaints at this time. \" Patient underwent Open treatment right subtrochanteric femur fracture with a cephalomedullary nail, biopsy right proximal femur on 11/3/23 and placed as WBAT on RLE    Restrictions/Precautions:  Restrictions/Precautions: General Precautions, Fall Risk  Position Activity Restriction  Other position/activity restrictions: WBAT RLE    Subjective  Chart Reviewed: Yes, Orders, Progress Notes, History and Physical  Patient assessed for rehabilitation services?: Yes    Subjective: Rn approved OT evaluation, patient pleasant and cooperative, however reported \"this knee isnt right. My leg is bent.'    Pain: 10 /10: standing, bearing weight through BLEs,   R knee tends to bow inward.      Vitals: Nurse checked vitals prior to session    Social/Functional History:  Lives With: Alone  Type of Home: House  Home Layout: One level  Home Access: Level entry  Home Equipment: [employer], 4 wheeled   Bathroom Shower/Tub: Walk-in shower  Bathroom Toilet: Handicap height  Bathroom Equipment: Grab Strengthening, Balance training, Functional mobility training, Endurance training, Neuromuscular re-education, Self-Care / ADL, Home management training, Equipment evaluation, education, & procurement, Patient/Caregiver education & training, Safety education & training. See long-term goal time frame for expected duration of plan of care. If no long-term goals established, a short length of stay is anticipated. Goals:  Patient goals : To have less pain  Short Term Goals  Time Frame for Short Term Goals: By discharge  Short Term Goal 1: Pt will complete sit> stand transfers, including toilet, with SBA to improve indep with toileting. Short Term Goal 2: Pt will tolerate standing x4 minutes with SBA to improve indep with managing LB clothing and sinkside grooming. Short Term Goal 3: Pt will navigate his RW to the bathroom with SBA to improve indep with toileting  Short Term Goal 4: Pt will complete LB dressing tasks with min A using LHAE prn to improve indep with dailly dressing tasks. Long Term Goals  Time Frame for Long Term Goals : No LTG due to short ELOS    AM-[facility] Inpatient Daily Activity Raw Score: 18  AM-[facility] Inpatient ADL T-Scale Score : 38.66    Following session, patient left in safe position with all fall risk precautions in place.

## 2023-11-05 NOTE — CASE COMMUNICATION
Writer accidentally selected wrong patient and medication when returning. In [facility]- this RN put that she returned one tablet of Hydrocodone-Acetaminophen 5-325mg. This was not correct. This RN meant to return a Oxycodone-Acetaminophen for patient in  with MRN  [id number]. Mike Echevarria was witness to return of oxycodone-acetaminophen. Patient did received both pulled tablets of Hydrocodone-Acetaminophen at  on 2023 . Please disregard return transaction.

## 2023-11-05 NOTE — PLAN OF CARE
Problem: Skin/Tissue Integrity  Goal: Absence of new skin breakdown  Description: 1. Monitor for areas of redness and/or skin breakdown  2. Assess vascular access sites hourly  3. Every 4-6 hours minimum:  Change oxygen saturation probe site  4. Every 4-6 hours:  If on nasal continuous positive airway pressure, respiratory therapy assess nares and determine need for appliance change or resting period. Outcome: Not Progressing  Note: See open area on coccyx despite foam patch and turning and repositioning.  See LDA     Problem: Discharge Planning  Goal: Discharge to home or other facility with appropriate resources  Outcome: Progressing  Flowsheets (Taken 11/5/2023 0344)  Discharge to home or other facility with appropriate resources:   Identify barriers to discharge with patient and caregiver   Arrange for needed discharge resources and transportation as appropriate   Identify discharge learning needs (meds, wound care, etc)     Problem: Pain  Goal: Verbalizes/displays adequate comfort level or baseline comfort level  Outcome: Progressing  Flowsheets (Taken 11/5/2023 0344)  Verbalizes/displays adequate comfort level or baseline comfort level:   Encourage patient to monitor pain and request assistance   Assess pain using appropriate pain scale   Administer analgesics based on type and severity of pain and evaluate response   Implement non-pharmacological measures as appropriate and evaluate response     Problem: Safety - Adult  Goal: Free from fall injury  Outcome: Progressing  Flowsheets  Taken 11/5/2023 0344  Free From Fall Injury: Instruct family/caregiver on patient safety  Taken 11/4/2023 2205  Free From Fall Injury: Instruct family/caregiver on patient safety     Problem: ABCDS Injury Assessment  Goal: Absence of physical injury  Outcome: Progressing  Flowsheets (Taken 11/5/2023 0344)  Absence of Physical Injury: Implement safety measures based on patient assessment     Problem: Chronic Conditions and

## 2023-11-06 PROBLEM — E43 SEVERE MALNUTRITION (HCC): Chronic | Status: ACTIVE | Noted: 2023-11-06

## 2023-11-06 LAB
ANION GAP SERPL CALC-SCNC: 7 MEQ/L (ref 8–16)
BASOPHILS ABSOLUTE: 0.1 THOU/MM3 (ref 0–0.1)
BASOPHILS NFR BLD AUTO: 1.1 %
BUN SERPL-MCNC: 19 MG/DL (ref 7–22)
CA-I BLD ISE-SCNC: 1.14 MMOL/L (ref 1.12–1.32)
CALCIUM SERPL-MCNC: 7.9 MG/DL (ref 8.5–10.5)
CHLORIDE SERPL-SCNC: 109 MEQ/L (ref 98–111)
CO2 SERPL-SCNC: 23 MEQ/L (ref 23–33)
CREAT SERPL-MCNC: 1.1 MG/DL (ref 0.4–1.2)
DEPRECATED RDW RBC AUTO: 65 FL (ref 35–45)
EOSINOPHIL NFR BLD AUTO: 8 %
EOSINOPHILS ABSOLUTE: 0.4 THOU/MM3 (ref 0–0.4)
ERYTHROCYTE [DISTWIDTH] IN BLOOD BY AUTOMATED COUNT: 19 % (ref 11.5–14.5)
GFR SERPL CREATININE-BSD FRML MDRD: > 60 ML/MIN/1.73M2
GLUCOSE SERPL-MCNC: 115 MG/DL (ref 70–108)
HCT VFR BLD AUTO: 28 % (ref 42–52)
HGB BLD-MCNC: 8.6 GM/DL (ref 14–18)
IMM GRANULOCYTES # BLD AUTO: 0.06 THOU/MM3 (ref 0–0.07)
IMM GRANULOCYTES NFR BLD AUTO: 1.1 %
LYMPHOCYTES ABSOLUTE: 0.7 THOU/MM3 (ref 1–4.8)
LYMPHOCYTES NFR BLD AUTO: 14.1 %
MAGNESIUM SERPL-MCNC: 2.3 MG/DL (ref 1.6–2.4)
MCH RBC QN AUTO: 28.9 PG (ref 26–33)
MCHC RBC AUTO-ENTMCNC: 30.7 GM/DL (ref 32.2–35.5)
MCV RBC AUTO: 94 FL (ref 80–94)
MONOCYTES ABSOLUTE: 0.7 THOU/MM3 (ref 0.4–1.3)
MONOCYTES NFR BLD AUTO: 13.2 %
NEUTROPHILS NFR BLD AUTO: 62.5 %
NRBC BLD AUTO-RTO: 0 /100 WBC
PLATELET # BLD AUTO: 85 THOU/MM3 (ref 130–400)
PMV BLD AUTO: 10.1 FL (ref 9.4–12.4)
POTASSIUM SERPL-SCNC: 4.3 MEQ/L (ref 3.5–5.2)
RBC # BLD AUTO: 2.98 MILL/MM3 (ref 4.7–6.1)
SCAN OF BLOOD SMEAR: NORMAL
SEGMENTED NEUTROPHILS ABSOLUTE COUNT: 3.3 THOU/MM3 (ref 1.8–7.7)
SODIUM SERPL-SCNC: 139 MEQ/L (ref 135–145)
WBC # BLD AUTO: 5.2 THOU/MM3 (ref 4.8–10.8)

## 2023-11-06 PROCEDURE — 97116 GAIT TRAINING THERAPY: CPT

## 2023-11-06 PROCEDURE — 80048 BASIC METABOLIC PNL TOTAL CA: CPT

## 2023-11-06 PROCEDURE — 97530 THERAPEUTIC ACTIVITIES: CPT

## 2023-11-06 PROCEDURE — 83735 ASSAY OF MAGNESIUM: CPT

## 2023-11-06 PROCEDURE — 6370000000 HC RX 637 (ALT 250 FOR IP): Performed by: PHYSICIAN ASSISTANT

## 2023-11-06 PROCEDURE — 2580000003 HC RX 258: Performed by: PHYSICIAN ASSISTANT

## 2023-11-06 PROCEDURE — 6370000000 HC RX 637 (ALT 250 FOR IP)

## 2023-11-06 PROCEDURE — 85025 COMPLETE CBC W/AUTO DIFF WBC: CPT

## 2023-11-06 PROCEDURE — 97110 THERAPEUTIC EXERCISES: CPT

## 2023-11-06 PROCEDURE — 82330 ASSAY OF CALCIUM: CPT

## 2023-11-06 PROCEDURE — 36415 COLL VENOUS BLD VENIPUNCTURE: CPT

## 2023-11-06 PROCEDURE — 1200000003 HC TELEMETRY R&B

## 2023-11-06 PROCEDURE — 1200000000 HC SEMI PRIVATE

## 2023-11-06 PROCEDURE — 97535 SELF CARE MNGMENT TRAINING: CPT

## 2023-11-06 PROCEDURE — 99232 SBSQ HOSP IP/OBS MODERATE 35: CPT

## 2023-11-06 RX ORDER — DOCUSATE SODIUM 100 MG/1
100 CAPSULE, LIQUID FILLED ORAL DAILY
Status: DISCONTINUED | OUTPATIENT
Start: 2023-11-06 | End: 2023-11-08

## 2023-11-06 RX ORDER — SENNA AND DOCUSATE SODIUM 50; 8.6 MG/1; MG/1
2 TABLET, FILM COATED ORAL DAILY PRN
Status: DISCONTINUED | OUTPATIENT
Start: 2023-11-06 | End: 2023-11-06

## 2023-11-06 RX ORDER — POLYETHYLENE GLYCOL 3350 17 G/17G
17 POWDER, FOR SOLUTION ORAL DAILY
Status: DISCONTINUED | OUTPATIENT
Start: 2023-11-07 | End: 2023-11-10 | Stop reason: HOSPADM

## 2023-11-06 RX ORDER — SENNA AND DOCUSATE SODIUM 50; 8.6 MG/1; MG/1
2 TABLET, FILM COATED ORAL 2 TIMES DAILY
Status: DISCONTINUED | OUTPATIENT
Start: 2023-11-06 | End: 2023-11-10 | Stop reason: HOSPADM

## 2023-11-06 RX ADMIN — TAMSULOSIN HYDROCHLORIDE 0.4 MG: 0.4 CAPSULE ORAL at 08:41

## 2023-11-06 RX ADMIN — HYDROCODONE BITARTRATE AND ACETAMINOPHEN 1 TABLET: 5; 325 TABLET ORAL at 22:29

## 2023-11-06 RX ADMIN — SODIUM CHLORIDE, PRESERVATIVE FREE 10 ML: 5 INJECTION INTRAVENOUS at 08:41

## 2023-11-06 RX ADMIN — FOLIC ACID 1 MG: 1 TABLET ORAL at 08:41

## 2023-11-06 RX ADMIN — FINASTERIDE 5 MG: 5 TABLET, FILM COATED ORAL at 08:41

## 2023-11-06 RX ADMIN — SENNOSIDES AND DOCUSATE SODIUM 2 TABLET: 50; 8.6 TABLET ORAL at 21:53

## 2023-11-06 RX ADMIN — HYDROCODONE BITARTRATE AND ACETAMINOPHEN 2 TABLET: 5; 325 TABLET ORAL at 08:38

## 2023-11-06 RX ADMIN — SODIUM CHLORIDE, PRESERVATIVE FREE 10 ML: 5 INJECTION INTRAVENOUS at 21:53

## 2023-11-06 RX ADMIN — Medication 3 MG: at 21:53

## 2023-11-06 RX ADMIN — POLYETHYLENE GLYCOL 3350 17 G: 17 POWDER, FOR SOLUTION ORAL at 08:38

## 2023-11-06 RX ADMIN — HYDROCODONE BITARTRATE AND ACETAMINOPHEN 2 TABLET: 5; 325 TABLET ORAL at 14:50

## 2023-11-06 RX ADMIN — PRAVASTATIN SODIUM 40 MG: 40 TABLET ORAL at 08:41

## 2023-11-06 RX ADMIN — HYDROCODONE BITARTRATE AND ACETAMINOPHEN 2 TABLET: 5; 325 TABLET ORAL at 04:05

## 2023-11-06 ASSESSMENT — PAIN SCALES - GENERAL
PAINLEVEL_OUTOF10: 5
PAINLEVEL_OUTOF10: 7
PAINLEVEL_OUTOF10: 4
PAINLEVEL_OUTOF10: 0
PAINLEVEL_OUTOF10: 0
PAINLEVEL_OUTOF10: 7
PAINLEVEL_OUTOF10: 7

## 2023-11-06 ASSESSMENT — PAIN DESCRIPTION - ORIENTATION
ORIENTATION: RIGHT
ORIENTATION: RIGHT
ORIENTATION: UPPER;RIGHT
ORIENTATION: RIGHT

## 2023-11-06 ASSESSMENT — PAIN DESCRIPTION - DESCRIPTORS
DESCRIPTORS: ACHING
DESCRIPTORS: ACHING;SHARP

## 2023-11-06 ASSESSMENT — PAIN - FUNCTIONAL ASSESSMENT
PAIN_FUNCTIONAL_ASSESSMENT: PREVENTS OR INTERFERES WITH MANY ACTIVE NOT PASSIVE ACTIVITIES
PAIN_FUNCTIONAL_ASSESSMENT: ACTIVITIES ARE NOT PREVENTED
PAIN_FUNCTIONAL_ASSESSMENT: PREVENTS OR INTERFERES WITH MANY ACTIVE NOT PASSIVE ACTIVITIES
PAIN_FUNCTIONAL_ASSESSMENT: ACTIVITIES ARE NOT PREVENTED

## 2023-11-06 ASSESSMENT — PAIN DESCRIPTION - LOCATION
LOCATION: LEG
LOCATION: HIP;KNEE

## 2023-11-06 ASSESSMENT — PAIN DESCRIPTION - FREQUENCY
FREQUENCY: INTERMITTENT
FREQUENCY: CONTINUOUS

## 2023-11-06 ASSESSMENT — PAIN DESCRIPTION - ONSET
ONSET: ON-GOING
ONSET: ON-GOING

## 2023-11-06 ASSESSMENT — PAIN DESCRIPTION - PAIN TYPE
TYPE: ACUTE PAIN;SURGICAL PAIN
TYPE: ACUTE PAIN;SURGICAL PAIN

## 2023-11-06 NOTE — PROGRESS NOTES
[facility]  INPATIENT PHYSICAL THERAPY  DAILY NOTE  [facility] ORTHOPEDICS 7K - [id number]    Time In: 1701  Time Out: 0920  Timed Code Treatment Minutes: 30 Minutes  Minutes: 30          Date: 2023  Patient Name: Janell Cade,  Gender:  male        MRN: [id number]  : 1935  (81 y.o.)     Referring Practitioner: Marisa Mak PA-C  Diagnosis: Closed Right hip fracture; initial encouter  Additional Pertinent Hx: Per H&P \"88 y.o. male who presents with right hip pain. Patient has had pain for about 2 weeks relegating him to cane use, but sustained a mechanical fall yesterday in his home, unsure if hit head, no loc. Immediate RLE pain and inability to ambulate. Head and neck imaging in the ED negative for acute process, did reveal a R IT femur fracture. Pain is to the right groin, worsened with weight bearing, ROM, relieved by immobilization, no paresthesias, no weakness. No other msk complaints at this time. \" Patient underwent Open treatment right subtrochanteric femur fracture with a cephalomedullary nail, biopsy right proximal femur on 11/3/23 and placed as WBAT on RLE     Prior Level of Function:  Lives With: Alone  Type of Home: [address]: One level  Home Access: Level entry  Home Equipment: [employer], 4 wheeled   Bathroom Shower/Tub: Walk-in shower  Bathroom Toilet: Handicap height  Bathroom Equipment: Grab bars in shower    ADL Assistance: [id number] Needs assistance (Patient reports he has a cleaning lady that comes every few weeks)  Ambulation Assistance: Independent  Transfer Assistance: Independent  Active : Yes  Additional Comments: Patient reports he was independent with all functional mobility prior to admission with use of cane for mobility. Patient reports he has a son around and his sister lives across the road that would be able to assist as needed.  Patient reports he has a daughter but lives farther

## 2023-11-06 NOTE — CARE COORDINATION
11/6/23, 1:09 PM EST    DISCHARGE PLANNING EVALUATION       Spoke with patient and he stated that he prefers to go to the swing bed at [facility]. Called and left a message for admission at [facility].

## 2023-11-06 NOTE — CARE COORDINATION
11/6/23, 3:00 PM EST    DISCHARGE PLANNING EVALUATION       Called and left a voicemail to make a referral to [facility].

## 2023-11-06 NOTE — DISCHARGE INSTRUCTIONS
Wound Care Instructions:  Buttocks- Clean wound with normal saline or wound cleanser and gauze. Pat dry with clean gauze. Apply skin prep wipe to sonia-wound, apply Triad to wound bed, cover with sacral bordered foam dressing. Change daily and PRN. Kat Mar MD        ACTIVITY / WEIGHTBEARING  INSTRUCTIONS:  Weightbearing as tolerated surgical extremity. PT/OT      DIET:  Increase Fluid/Water intake, eat foods high in fiber, fruits and vegetables to help to prevent constipation. WOUND/DRESSING INSTRUCTIONS:  Always ensure you wash your hands before and after caring for your wound/dressing. Keep your dressing clean and dry. Change dressing as needed. Can wash around incision. If prineo (mesh tape dressing) in place, this may be removed after 3 weeks. You may shower with prineo dressing if no active drainage coming from incision. Do not let shower water directly hit the incision. Dry completely. Do not place antibiotic ointment, lotion, creams on surgical incision. Smoking impairs adequate wound healing. If smoking, consider quitting. May apply ice to surgical incision to help to prevent swelling. MEDICATION INSTRUCTIONS:  Take pain medication as prescribed. See orders regarding resuming your home medications and any new medications. Continue blood thinner if ordered by your physician. FOLLOW-UP CARE:  If a follow-up appointment was not made for you at discharge, call [phone] [facility] office) or [phone] [facility] office) to schedule an appointment for 8 weeks. Call Dr Jessica Alvarez office with any concerns. Signs of infection such as fever, chills, redness, pus, or bad smelling discharge from incision site. Excessive bleeding, swelling, increasing pain, or discharge around incision site. The stitches or staples come apart at the incision site.                  Cough shortness of

## 2023-11-06 NOTE — CARE COORDINATION
11/6/23, 1:55 PM EST    DISCHARGE ON GOING EVALUATION    [facility] day: 4  Location: -24/024-A Reason for admit: Closed right hip fracture, initial encounter [facility]) [S72.001A]   Procedure: 11/3 Open treatment right subtrochanteric femur fracture with a cephalomedullary nail, biopsy right proximal femur  Barriers to Discharge: POD 3. Continue with therapy. Bowel regimen. Pain control. Discharge planning. PCP: BREN Landrum  Readmission Risk Score: 15.8%  Patient Goals/Plan/Treatment Preferences: From home alone. Planning for [facility] swing bed at this time.

## 2023-11-06 NOTE — PROGRESS NOTES
Comprehensive Nutrition Assessment    Type and Reason for Visit:  Initial, Consult (Poor intake/appetite for 5 or more days)    Nutrition Recommendations/Plan:   Recommend MVI  Continue 3 CHO diet order per provider and encourage PO intake at best efforts  Start ONS: Glucerna (TID) - chocolate  Will monitor need for additional nutrition interventions. Malnutrition Assessment:  Malnutrition Status:  Severe malnutrition (11/06/23 1035)    Context:  Chronic Illness     Findings of the 6 clinical characteristics of malnutrition:  Energy Intake:  75% or less estimated energy requirements for 1 month or longer (decreased appetite/intake for at least 1 year)  Weight Loss:  Mild weight loss (specify amount and time period) (-4% in a year)     Body Fat Loss:  Severe body fat loss Orbital, Triceps, Buccal region   Muscle Mass Loss:  Severe muscle mass loss Temples (temporalis), Clavicles (pectoralis & deltoids)  Fluid Accumulation:  Mild Extremities   Strength:  Not Performed    Nutrition Assessment:     Pt. severely malnourished AEB criteria listed above. At risk for further nutritional compromise r/t R intertrochanteric femur fracture s/p surgery 11/3, myelodysplastic syndrome - follows oncology at [facility], blood transfusions, moderate aortic stenosis, increased nutrient needs to promote wound healing and underlying medical condition (PMHx: BPH, cardiac murmur, DM, hearing loss, HLD, HTN, thrombocytopenia). Nutrition Related Findings:    Pt. Report/Treatments/Miscellaneous: Pt seen, reports poor appetite for about a year now with some gradual weight loss. He states that [facility] keeps telling him to eat more and not lose anymore weight. \" But finds it difficult to regain and eat consistently with poor appetite. PTA he was only eating about one meal per day and some small snacking in the morning with his coffee. Discussed small frequent meals and ONS use - pt agreed to trying Glucerna.  Pt Education initiated (discussed the importance of eating consistently and preventing further weight loss, encouraged small frequent meals, discussed ONS)  Coordination of Nutrition Care: Continue to monitor while inpatient       Goals:     Goals: Meet at least 75% of estimated needs, by next RD assessment       Nutrition Monitoring and Evaluation:      Food/Nutrient Intake Outcomes: Diet Advancement/Tolerance, Food and Nutrient Intake, Supplement Intake  Physical Signs/Symptoms Outcomes: Biochemical Data, GI Status, Constipation, Fluid Status or Edema, Nutrition Focused Physical Findings, Skin, Weight    Discharge Planning:     Too soon to determine     Zaria Carrlilo RD, LD  Contact: [phone]

## 2023-11-06 NOTE — PROGRESS NOTES
[facility] Nurse  Progress Note       NAME:  Ruthy Kebede  MEDICAL RECORD NUMBER:  [id number]  AGE: 81 y.o. GENDER: male  : 1935  TODAY'S DATE:  2023    Subjective   Reason for WOCN Evaluation and Assessment: worsening injury to coccyx      Ruthy Kebede is a 81 y.o. male referred by:   [] Physician/ PA/ APRN-CNP  [x] Nursing  [] Other:     Wound Identification:  Wound Type:  MASD  Contributing Factors:  moisture, use of depends, anatomy    Patient Goal of Care:  [x] Wound Healing  [x] Drainage Management   [] Odor Control  [] Palliative Care  [x] Pain Control         Objective     Brenden Risk Score: Brenden Scale Score: 15    Patient Active Problem List   Diagnosis Code    BPH (benign prostatic hyperplasia) N40.0    Hyperlipidemia E78.5    Hypertension I10    Easy bruising R23.3    TIA involving left internal carotid artery G45. 1    Chronic cerebral ischemia I67.82    Dementia without behavioral disturbance (HCC) F03.90    Balance problem R26.89    Cerebral atrophy (HCC) G31.9    Bilateral carotid artery stenosis N20.19    Folic acid deficiency S86.9    Aortic stenosis, moderate I35.0    Closed right hip fracture, initial encounter ([address]) S72.001A    Myelodysplastic syndrome (HCC) D46.9    Severe malnutrition ([address]) E43       Assessment     Encounter: Present to patient room. Patient in chair upon arrival. Assessment and photo to follow. Assisted patient to lean forward for evaluation. Old dressing removed. Cleansed wound with normal saline and gauze. Pat dry with clean gauze. Patient noted to have blanchable redness to bilateral upper buttocks, with MASD. Skin prep applied to sonia-wound, Triad applied to wound, covered with sacral bordered foam. Staff to change daily. Waffle cushion in chair. Patient in chair, call light in reach. Call with concerns.      Photo:    Wound: Bilateral buttocks blanchable redness with MASD, minimally draining     Plan     Treatment Recommendations: Buttocks- Clean wound with normal saline or wound cleanser and gauze. Pat dry with clean gauze. Apply skin prep wipe to sonia-wound, apply Triad to wound bed, cover with sacral bordered foam dressing. Change daily and PRN. Specialty Bed Required : Yes   [x] Low Air Loss   [x] Pressure Redistribution  [] Fluid Immersion  [] Bariatric  [] Total Pressure Relief  [] Other:     Current Diet: ADULT DIET;  Regular; 3 carb choices (45 gm/meal)  ADULT ORAL NUTRITION SUPPLEMENT; Breakfast, Lunch, Dinner; Diabetic Oral Supplement  Dietician consulted:  Yes    Discharge Plan:  Placement for patient upon discharge: skilled nursing    Patient appropriate for Outpatient [facility]: No  [x] Discharge instructions completed/ updated

## 2023-11-06 NOTE — PROGRESS NOTES
[facility] [address] [facility]  Occupational Therapy  Daily Note  Time:   Time In: 1020  Time Out: 1050  Timed Code Treatment Minutes: 30 Minutes  Minutes: 30          Date: 2023  Patient Name: Cal Mahajan,   Gender: male      Room: [facility]24/024-A  MRN: [id number]  : 1935  (81 y.o.)  Referring Practitioner: Tay Kramer., HINA  Diagnosis: Closed right hip fracture, initial encounter  Additional Pertinent Hx: Per H&P \"88 y.o. male who presents with right hip pain. Patient has had pain for about 2 weeks relegating him to cane use, but sustained a mechanical fall yesterday in his home, unsure if hit head, no loc. Immediate RLE pain and inability to ambulate. Head and neck imaging in the ED negative for acute process, did reveal a R IT femur fracture. Pain is to the right groin, worsened with weight bearing, ROM, relieved by immobilization, no paresthesias, no weakness. No other msk complaints at this time. \" Patient underwent Open treatment right subtrochanteric femur fracture with a cephalomedullary nail, biopsy right proximal femur on 11/3/23 and placed as WBAT on RLE    Restrictions/Precautions:  Restrictions/Precautions: General Precautions, Fall Risk  Position Activity Restriction  Other position/activity restrictions: WBAT RLE     SUBJECTIVE: RN okayed OT  session. Pt. In room and agreeable to participate. PAIN: 8/10: RLE with movement    Vitals: Vitals not assessed per clinical judgement, see nursing flowsheet    COGNITION: WFL    ADL:   Footwear Management: Moderate Assistance. To doff and don after instruction and demo provided with AE use sock aid and reacher Pt. Would benefit from continued review. [facility] BALANCE:  Sitting Balance:  Stand By Assistance. Standing Balance: Contact Guard Assistance. BED MOBILITY:  Not Tested    TRANSFERS:  Sit to Stand:  Minimal Assistance, with increased time for completion, cues for hand placement.  From recliner  Stand

## 2023-11-06 NOTE — PLAN OF CARE
Problem: Discharge Planning  Goal: Discharge to home or other facility with appropriate resources  Outcome: Progressing  Flowsheets (Taken 11/5/2023 2303)  Discharge to home or other facility with appropriate resources:   Identify barriers to discharge with patient and caregiver   Identify discharge learning needs (meds, wound care, etc)   Arrange for needed discharge resources and transportation as appropriate     Problem: Pain  Goal: Verbalizes/displays adequate comfort level or baseline comfort level  Outcome: Progressing  Flowsheets (Taken 11/5/2023 2303)  Verbalizes/displays adequate comfort level or baseline comfort level:   Encourage patient to monitor pain and request assistance   Assess pain using appropriate pain scale   Administer analgesics based on type and severity of pain and evaluate response   Implement non-pharmacological measures as appropriate and evaluate response     Problem: Safety - Adult  Goal: Free from fall injury  Outcome: Progressing  Flowsheets  Taken 11/5/2023 2303  Free From Fall Injury: Instruct family/caregiver on patient safety  Taken 11/5/2023 2300  Free From Fall Injury: Instruct family/caregiver on patient safety     Problem: ABCDS Injury Assessment  Goal: Absence of physical injury  Outcome: Progressing  Flowsheets  Taken 11/5/2023 2303  Absence of Physical Injury: Implement safety measures based on patient assessment  Taken 11/5/2023 2300  Absence of Physical Injury: Implement safety measures based on patient assessment     Problem: Chronic Conditions and Co-morbidities  Goal: Patient's chronic conditions and co-morbidity symptoms are monitored and maintained or improved  Outcome: Progressing  Flowsheets (Taken 11/5/2023 2303)  Care Plan - Patient's Chronic Conditions and Co-Morbidity Symptoms are Monitored and Maintained or Improved:   Monitor and assess patient's chronic conditions and comorbid symptoms for stability, deterioration, or improvement   Collaborate with multidisciplinary team to address chronic and comorbid conditions and prevent exacerbation or deterioration   Update acute care plan with appropriate goals if chronic or comorbid symptoms are exacerbated and prevent overall improvement and discharge     Problem: Skin/Tissue Integrity - Adult  Goal: Skin integrity remains intact  Outcome: Progressing  Flowsheets  Taken 11/5/2023 2303  Skin Integrity Remains Intact:   Monitor for areas of redness and/or skin breakdown   Assess vascular access sites hourly  Taken 11/5/2023 2302  Skin Integrity Remains Intact:   Monitor for areas of redness and/or skin breakdown   Assess vascular access sites hourly     Problem: Musculoskeletal - Adult  Goal: Return mobility to safest level of function  Outcome: Progressing  Flowsheets (Taken 11/5/2023 2303)  Return Mobility to Safest Level of Function:   Assess patient stability and activity tolerance for standing, transferring and ambulating with or without assistive devices   Assist with transfers and ambulation using safe patient handling equipment as needed   Ensure adequate protection for wounds/incisions during mobilization   Obtain physical therapy/occupational therapy consults as needed     Problem: Gastrointestinal - Adult  Goal: Maintains or returns to baseline bowel function  Outcome: Progressing  Flowsheets (Taken 11/5/2023 2303)  Maintains or returns to baseline bowel function:   Assess bowel function   Encourage oral fluids to ensure adequate hydration   Administer ordered medications as needed   Encourage mobilization and activity     Problem: Genitourinary - Adult  Goal: Urinary catheter remains patent  Outcome: Progressing  Flowsheets (Taken 11/5/2023 2303)  Urinary catheter remains patent: Assess patency of urinary catheter     Problem: Infection - Adult  Goal: Absence of infection at discharge  Outcome: Progressing  Flowsheets (Taken 11/5/2023 2303)  Absence of infection at discharge:   Assess and monitor for signs

## 2023-11-07 LAB
ANION GAP SERPL CALC-SCNC: 9 MEQ/L (ref 8–16)
BUN SERPL-MCNC: 16 MG/DL (ref 7–22)
CALCIUM SERPL-MCNC: 8.5 MG/DL (ref 8.5–10.5)
CHLORIDE SERPL-SCNC: 105 MEQ/L (ref 98–111)
CO2 SERPL-SCNC: 23 MEQ/L (ref 23–33)
CREAT SERPL-MCNC: 1.1 MG/DL (ref 0.4–1.2)
DEPRECATED RDW RBC AUTO: 64 FL (ref 35–45)
EKG ATRIAL RATE: 78 BPM
EKG P AXIS: 57 DEGREES
EKG P-R INTERVAL: 194 MS
EKG Q-T INTERVAL: 378 MS
EKG QRS DURATION: 92 MS
EKG QTC CALCULATION (BAZETT): 430 MS
EKG R AXIS: 25 DEGREES
EKG T AXIS: 88 DEGREES
EKG VENTRICULAR RATE: 78 BPM
ERYTHROCYTE [DISTWIDTH] IN BLOOD BY AUTOMATED COUNT: 18.8 % (ref 11.5–14.5)
GFR SERPL CREATININE-BSD FRML MDRD: > 60 ML/MIN/1.73M2
GLUCOSE SERPL-MCNC: 123 MG/DL (ref 70–108)
HCT VFR BLD AUTO: 29.7 % (ref 42–52)
HGB BLD-MCNC: 9.1 GM/DL (ref 14–18)
MCH RBC QN AUTO: 28.6 PG (ref 26–33)
MCHC RBC AUTO-ENTMCNC: 30.6 GM/DL (ref 32.2–35.5)
MCV RBC AUTO: 93.4 FL (ref 80–94)
PLATELET # BLD AUTO: 91 THOU/MM3 (ref 130–400)
PMV BLD AUTO: 10.7 FL (ref 9.4–12.4)
POTASSIUM SERPL-SCNC: 4.3 MEQ/L (ref 3.5–5.2)
RBC # BLD AUTO: 3.18 MILL/MM3 (ref 4.7–6.1)
SODIUM SERPL-SCNC: 137 MEQ/L (ref 135–145)
WBC # BLD AUTO: 5.8 THOU/MM3 (ref 4.8–10.8)

## 2023-11-07 PROCEDURE — 1200000003 HC TELEMETRY R&B

## 2023-11-07 PROCEDURE — 97530 THERAPEUTIC ACTIVITIES: CPT

## 2023-11-07 PROCEDURE — 80048 BASIC METABOLIC PNL TOTAL CA: CPT

## 2023-11-07 PROCEDURE — 99232 SBSQ HOSP IP/OBS MODERATE 35: CPT

## 2023-11-07 PROCEDURE — 6370000000 HC RX 637 (ALT 250 FOR IP)

## 2023-11-07 PROCEDURE — 6370000000 HC RX 637 (ALT 250 FOR IP): Performed by: PHYSICIAN ASSISTANT

## 2023-11-07 PROCEDURE — 85027 COMPLETE CBC AUTOMATED: CPT

## 2023-11-07 PROCEDURE — 2580000003 HC RX 258: Performed by: PHYSICIAN ASSISTANT

## 2023-11-07 PROCEDURE — 97110 THERAPEUTIC EXERCISES: CPT

## 2023-11-07 PROCEDURE — 1200000000 HC SEMI PRIVATE

## 2023-11-07 PROCEDURE — 36415 COLL VENOUS BLD VENIPUNCTURE: CPT

## 2023-11-07 PROCEDURE — 97535 SELF CARE MNGMENT TRAINING: CPT

## 2023-11-07 PROCEDURE — 99221 1ST HOSP IP/OBS SF/LOW 40: CPT | Performed by: NURSE PRACTITIONER

## 2023-11-07 RX ADMIN — HYDROCODONE BITARTRATE AND ACETAMINOPHEN 2 TABLET: 5; 325 TABLET ORAL at 06:35

## 2023-11-07 RX ADMIN — FINASTERIDE 5 MG: 5 TABLET, FILM COATED ORAL at 11:02

## 2023-11-07 RX ADMIN — POLYETHYLENE GLYCOL 3350 17 G: 17 POWDER, FOR SOLUTION ORAL at 10:15

## 2023-11-07 RX ADMIN — TAMSULOSIN HYDROCHLORIDE 0.4 MG: 0.4 CAPSULE ORAL at 13:56

## 2023-11-07 RX ADMIN — SODIUM CHLORIDE, PRESERVATIVE FREE 10 ML: 5 INJECTION INTRAVENOUS at 10:15

## 2023-11-07 RX ADMIN — SENNOSIDES AND DOCUSATE SODIUM 2 TABLET: 50; 8.6 TABLET ORAL at 21:16

## 2023-11-07 RX ADMIN — PRAVASTATIN SODIUM 40 MG: 40 TABLET ORAL at 11:02

## 2023-11-07 RX ADMIN — DOCUSATE SODIUM 100 MG: 100 CAPSULE, LIQUID FILLED ORAL at 10:15

## 2023-11-07 RX ADMIN — Medication 3 MG: at 21:16

## 2023-11-07 RX ADMIN — HYDROCODONE BITARTRATE AND ACETAMINOPHEN 1 TABLET: 5; 325 TABLET ORAL at 21:16

## 2023-11-07 RX ADMIN — SODIUM CHLORIDE, PRESERVATIVE FREE 10 ML: 5 INJECTION INTRAVENOUS at 21:16

## 2023-11-07 RX ADMIN — FOLIC ACID 1 MG: 1 TABLET ORAL at 11:02

## 2023-11-07 RX ADMIN — SENNOSIDES AND DOCUSATE SODIUM 2 TABLET: 50; 8.6 TABLET ORAL at 10:15

## 2023-11-07 ASSESSMENT — PAIN SCALES - GENERAL
PAINLEVEL_OUTOF10: 3
PAINLEVEL_OUTOF10: 0
PAINLEVEL_OUTOF10: 5
PAINLEVEL_OUTOF10: 7

## 2023-11-07 ASSESSMENT — PAIN DESCRIPTION - LOCATION
LOCATION: LEG
LOCATION: LEG

## 2023-11-07 ASSESSMENT — PAIN DESCRIPTION - PAIN TYPE
TYPE: ACUTE PAIN;SURGICAL PAIN
TYPE: SURGICAL PAIN

## 2023-11-07 ASSESSMENT — PAIN - FUNCTIONAL ASSESSMENT
PAIN_FUNCTIONAL_ASSESSMENT: PREVENTS OR INTERFERES SOME ACTIVE ACTIVITIES AND ADLS
PAIN_FUNCTIONAL_ASSESSMENT: PREVENTS OR INTERFERES SOME ACTIVE ACTIVITIES AND ADLS

## 2023-11-07 ASSESSMENT — PAIN DESCRIPTION - ONSET
ONSET: ON-GOING
ONSET: ON-GOING

## 2023-11-07 ASSESSMENT — PAIN DESCRIPTION - ORIENTATION
ORIENTATION: LEFT
ORIENTATION: LEFT

## 2023-11-07 ASSESSMENT — PAIN DESCRIPTION - DESCRIPTORS
DESCRIPTORS: DULL;ACHING
DESCRIPTORS: DULL;DISCOMFORT

## 2023-11-07 ASSESSMENT — PAIN DESCRIPTION - FREQUENCY
FREQUENCY: INTERMITTENT
FREQUENCY: INTERMITTENT

## 2023-11-07 NOTE — PROGRESS NOTES
PM&R provider saw patient, in conversation, the patients goal is to get  to home for further therapy; she discussed this with the son looking for a  SNF closer to home.

## 2023-11-07 NOTE — CARE COORDINATION
11/7/23, 2:12 PM EST    DISCHARGE ON GOING EVALUATION    [facility] day: 5  Location: -24/024-A Reason for admit: Closed right hip fracture, initial encounter [facility]) [S72.001A]   Procedure:   11/3 XR Right Femur: Status post open reduction internal fixation of the right femoral fracture. 2. Intramedullary morris in the right femur. Compression screw in the right femoral head and neck. 11/3 OR with Dr. Crain Reasoner: Open treatment right subtrochanteric femur fracture with a cephalomedullary nail, biopsy right proximal femur  11/4 XR Right Knee/Femur: Postsurgical changes of open reduction internal fixation involving the right femur. The hardware appears intact without evidence of fracturing or loosening. 2. Degenerative changes at the right knee    Barriers to Discharge: Hospitalist and Ortho following. New consult to PM&R. PT/OT. POD#4 R femur IMN. WBAT RLE. Daily wound care to buttocks. PO pain control. Bowel regimen. Afebrile. Room air. Hgb 9.1 (low of 6.6 on 11/3) Has received 3 units PRBC this admit. PCP: BREN Hernandez  Readmission Risk Score: 15.9%  Patient Goals/Plan/Treatment Preferences: From home alone. Wanted [address] Bed, however they won't have an open bed until at least end of week. Potential IPR. SW following.

## 2023-11-07 NOTE — PLAN OF CARE
Problem: Discharge Planning  Goal: Discharge to home or other facility with appropriate resources  11/7/2023 1733 by Sergio Arthur RN  Outcome: Progressing  Flowsheets (Taken 11/5/2023 2303 by Froilan Galloway RN)  Discharge to home or other facility with appropriate resources:   Identify barriers to discharge with patient and caregiver   Identify discharge learning needs (meds, wound care, etc)   Arrange for needed discharge resources and transportation as appropriate     Problem: Pain  Goal: Verbalizes/displays adequate comfort level or baseline comfort level  11/7/2023 1733 by Sergio Arthur RN  Outcome: Progressing  Flowsheets (Taken 11/5/2023 2303 by Froilan Galloway RN)  Verbalizes/displays adequate comfort level or baseline comfort level:   Encourage patient to monitor pain and request assistance   Assess pain using appropriate pain scale   Administer analgesics based on type and severity of pain and evaluate response   Implement non-pharmacological measures as appropriate and evaluate response     Problem: Safety - Adult  Goal: Free from fall injury  11/7/2023 1733 by Sergio Arthur RN  Outcome: Progressing  Flowsheets (Taken 11/5/2023 2303 by Froilan Galloway RN)  Free From Fall Injury: Instruct family/caregiver on patient safety     Problem: ABCDS Injury Assessment  Goal: Absence of physical injury  11/7/2023 1733 by Sergio Arthur RN  Outcome: Progressing  Flowsheets (Taken 11/5/2023 2303 by Froilan Galloway RN)  Absence of Physical Injury: Implement safety measures based on patient assessment     Problem: Chronic Conditions and Co-morbidities  Goal: Patient's chronic conditions and co-morbidity symptoms are monitored and maintained or improved  11/7/2023 1733 by Sergio Arthur RN  Outcome: Progressing  Flowsheets (Taken 11/5/2023 2303 by Froilan Galloway RN)  Care Plan - Patient's Chronic Conditions and Co-Morbidity Symptoms are Monitored and Maintained or Improved:   Monitor and assess patient's chronic conditions and comorbid symptoms for stability, deterioration, or improvement   Collaborate with multidisciplinary team to address chronic and comorbid conditions and prevent exacerbation or deterioration   Update acute care plan with appropriate goals if chronic or comorbid symptoms are exacerbated and prevent overall improvement and discharge     Problem: Skin/Tissue Integrity - Adult  Goal: Skin integrity remains intact  11/7/2023 1733 by Loida Willson RN  Outcome: Progressing  Flowsheets (Taken 11/5/2023 2303 by Luisana Sutton RN)  Skin Integrity Remains Intact:   Monitor for areas of redness and/or skin breakdown   Assess vascular access sites hourly     Problem: Musculoskeletal - Adult  Goal: Return mobility to safest level of function  11/7/2023 1733 by Loida Willson RN  Outcome: Progressing  Flowsheets (Taken 11/5/2023 2303 by Luisana Sutton RN)  Return Mobility to Safest Level of Function:   Assess patient stability and activity tolerance for standing, transferring and ambulating with or without assistive devices   Assist with transfers and ambulation using safe patient handling equipment as needed   Ensure adequate protection for wounds/incisions during mobilization   Obtain physical therapy/occupational therapy consults as needed     Problem: Gastrointestinal - Adult  Goal: Maintains or returns to baseline bowel function  11/7/2023 1733 by Loida Willson RN  Outcome: Progressing  Flowsheets (Taken 11/5/2023 2303 by Luisana Sutton RN)  Maintains or returns to baseline bowel function:   Assess bowel function   Encourage oral fluids to ensure adequate hydration   Administer ordered medications as needed   Encourage mobilization and activity     Problem: Genitourinary - Adult  Goal: Urinary catheter remains patent  11/7/2023 1733 by Loida Willson RN  Outcome: Progressing  Flowsheets (Taken 11/5/2023 2303 by Luisana Sutton RN)  Urinary catheter remains patent: Assess patency of urinary catheter     Problem: Infection - Adult  Goal: Absence of infection at discharge  11/7/2023 1733 by Breann Padilla RN  Outcome: Progressing  Flowsheets (Taken 11/5/2023 2303 by Cornel Tay RN)  Absence of infection at discharge:   Assess and monitor for signs and symptoms of infection   Monitor lab/diagnostic results   Monitor all insertion sites i.e., indwelling lines, tubes and drains   Cement appropriate cooling/warming therapies per order   Administer medications as ordered   Instruct and encourage patient and family to use good hand hygiene technique     Problem: Hematologic - Adult  Goal: Maintains hematologic stability  11/7/2023 1733 by Breann Padilla RN  Outcome: Progressing  Flowsheets (Taken 11/5/2023 2303 by Cornel Tay RN)  Maintains hematologic stability:   Assess for signs and symptoms of bleeding or hemorrhage   Monitor labs for bleeding or clotting disorders     Problem: Skin/Tissue Integrity  Goal: Absence of new skin breakdown  Description: 1. Monitor for areas of redness and/or skin breakdown  2. Assess vascular access sites hourly  3. Every 4-6 hours minimum:  Change oxygen saturation probe site  4. Every 4-6 hours:  If on nasal continuous positive airway pressure, respiratory therapy assess nares and determine need for appliance change or resting period. 11/7/2023 1733 by Breann Padilla RN  Outcome: Progressing  Note: No new skin breakdown this shift     Problem: Nutrition Deficit:  Goal: Optimize nutritional status  11/7/2023 1733 by Breann Padilla RN  Outcome: Progressing  Flowsheets (Taken 11/7/2023 0411 by Clayton Morton RN)  Nutrient intake appropriate for improving, restoring, or maintaining nutritional needs:   Assess nutritional status and recommend course of action   Monitor oral intake, labs, and treatment plans   Care plan reviewed with patient. Patient verbalize understanding of the plan of care and contribute to goal setting.

## 2023-11-07 NOTE — PROGRESS NOTES
[address] [facility]  Occupational Therapy  Daily Note  Time:   Time In: 5022  Time Out: 1130  Timed Code Treatment Minutes: 27 Minutes  Minutes: 27          Date: 2023  Patient Name: Lynn Aguirre,   Gender: male      Room: [facility]24/024-A  MRN: [id number]  : 1935  (81 y.o.)  Referring Practitioner: Steven Yeager., HINA  Diagnosis: Closed right hip fracture, initial encounter  Additional Pertinent Hx: Per H&P \"88 y.o. male who presents with right hip pain. Patient has had pain for about 2 weeks relegating him to cane use, but sustained a mechanical fall yesterday in his home, unsure if hit head, no loc. Immediate RLE pain and inability to ambulate. Head and neck imaging in the ED negative for acute process, did reveal a R IT femur fracture. Pain is to the right groin, worsened with weight bearing, ROM, relieved by immobilization, no paresthesias, no weakness. No other msk complaints at this time. \" Patient underwent Open treatment right subtrochanteric femur fracture with a cephalomedullary nail, biopsy right proximal femur on 11/3/23 and placed as WBAT on RLE    Restrictions/Precautions:  Restrictions/Precautions: General Precautions, Fall Risk  Position Activity Restriction  Other position/activity restrictions: WBAT RLE     SUBJECTIVE: Pt sitting in the recliner upon arrival, pt educated on importance of getting up to use the restroom, pt agreeable to OT session     PAIN: 0/10:     Vitals: Nurse checked vitals prior to session    COGNITION: Slow Processing    ADL:   Lower Extremity Dressing: Maximum Assistance. With donning brief sitting and standing . BALANCE:  Standing Balance: Minimal Assistance. With RW, and BUE support on the walker with pt standing for 1-2 minutes with vc's for upright posture due to pt demoing forward flexed posture     BED MOBILITY:  Not Tested    TRANSFERS:  Sit to Stand: Moderate Assistance.  From the recliner  Stand to Sit: Moderate Assistance. Back to the recliner with vc's to reach back     FUNCTIONAL MOBILITY:  Assistive Device: Rolling Walker  Assist Level:  Contact Guard Assistance. Distance:  short distances in the room, with a slow, steady pace, and vc's for upright posture       ADDITIONAL ACTIVITIES:  Pt educated on importance of using restroom this date, and not external catheter with pt agreeable to trial brief, and urinal. RN informed ANDRADE patient requesting external catheter back on due to frequent urination        Modified Estrella Scale:  Not Tested    ASSESSMENT:     Activity Tolerance:  Patient tolerance of  treatment: Fair treatment tolerance      Discharge Recommendations: ECF with OT  Equipment Recommendations: Other: Recommend a LHAE kit and shower chair. Plan: Times Per Week: 6x  Current Treatment Recommendations: Strengthening, Balance training, Functional mobility training, Endurance training, Neuromuscular re-education, Self-Care / ADL, Home management training, Equipment evaluation, education, & procurement, Patient/Caregiver education & training, Safety education & training    Patient Education  Patient Education: ADL's    Goals  Short Term Goals  Time Frame for Short Term Goals: By discharge  Short Term Goal 1: Pt will complete sit> stand transfers, including toilet, with SBA to improve indep with toileting. Short Term Goal 2: Pt will tolerate standing x4 minutes with SBA to improve indep with managing LB clothing and sinkside grooming. Short Term Goal 3: Pt will navigate his RW to the bathroom with SBA to improve indep with toileting  Short Term Goal 4: Pt will complete LB dressing tasks with min A using LHAE prn to improve indep with dailly dressing tasks. Long Term Goals  Time Frame for Long Term Goals : No LTG due to short ELOS    Following session, patient left in safe position with all fall risk precautions in place.

## 2023-11-07 NOTE — PLAN OF CARE
Problem: Discharge Planning  Goal: Discharge to home or other facility with appropriate resources  Outcome: Progressing  Flowsheets (Taken 11/5/2023 2303 by Stephanie Enamorado, RN)  Discharge to home or other facility with appropriate resources:   Identify barriers to discharge with patient and caregiver   Identify discharge learning needs (meds, wound care, etc)   Arrange for needed discharge resources and transportation as appropriate     Problem: Pain  Goal: Verbalizes/displays adequate comfort level or baseline comfort level  Outcome: Progressing  Flowsheets (Taken 11/5/2023 2303 by Stephanie Enamorado, RN)  Verbalizes/displays adequate comfort level or baseline comfort level:   Encourage patient to monitor pain and request assistance   Assess pain using appropriate pain scale   Administer analgesics based on type and severity of pain and evaluate response   Implement non-pharmacological measures as appropriate and evaluate response     Problem: Safety - Adult  Goal: Free from fall injury  Outcome: Progressing  Flowsheets (Taken 11/5/2023 2303 by Stephanie Enamorado, RN)  Free From Fall Injury: Instruct family/caregiver on patient safety     Problem: ABCDS Injury Assessment  Goal: Absence of physical injury  Outcome: Progressing  Flowsheets (Taken 11/5/2023 2303 by Stephanie Enamorado RN)  Absence of Physical Injury: Implement safety measures based on patient assessment     Problem: Chronic Conditions and Co-morbidities  Goal: Patient's chronic conditions and co-morbidity symptoms are monitored and maintained or improved  Outcome: Progressing  Flowsheets (Taken 11/5/2023 2303 by Stephanie Enamorado, RN)  Care Plan - Patient's Chronic Conditions and Co-Morbidity Symptoms are Monitored and Maintained or Improved:   Monitor and assess patient's chronic conditions and comorbid symptoms for stability, deterioration, or improvement   Collaborate with multidisciplinary team to address chronic and comorbid conditions and prevent

## 2023-11-07 NOTE — CARE COORDINATION
11/7/23, 8:17 AM EST    DISCHARGE PLANNING EVALUATION       Left a message to attempt to make a referral to [address] [facility] bed.

## 2023-11-07 NOTE — PROGRESS NOTES
[address] [place] [address] 7K - 2O-[id number]/766-P  Daily Note    Time In: 7217  Time Out: 0945  Timed Code Treatment Minutes: 27 Minutes  Minutes: 27          Date: 2023  Patient Name: Darvin Gurrola,  Gender:  male        MRN: [id number]  : 1935  (81 y.o.)     Referring Practitioner: Carmen Hill PA-C  Diagnosis: Closed Right hip fracture; initial encouter  Additional Pertinent Hx: Per H&P \"88 y.o. male who presents with right hip pain. Patient has had pain for about 2 weeks relegating him to cane use, but sustained a mechanical fall yesterday in his home, unsure if hit head, no loc. Immediate RLE pain and inability to ambulate. Head and neck imaging in the ED negative for acute process, did reveal a R IT femur fracture. Pain is to the right groin, worsened with weight bearing, ROM, relieved by immobilization, no paresthesias, no weakness. No other msk complaints at this time. \" Patient underwent Open treatment right subtrochanteric femur fracture with a cephalomedullary nail, biopsy right proximal femur on 11/3/23 and placed as WBAT on RLE     Prior Level of Function:  Lives With: Alone  Type of Home: [address]: One level  Home Access: Level entry  Home Equipment: Lonnie Pipe, 4 wheeled   Bathroom Shower/Tub: Walk-in shower  Bathroom Toilet: Handicap height  Bathroom Equipment: Grab bars in shower    ADL Assistance: [id number] Needs assistance (Patient reports he has a cleaning lady that comes every few weeks)  Ambulation Assistance: Independent  Transfer Assistance: Independent  Active : Yes  Additional Comments: Patient reports he was independent with all functional mobility prior to admission with use of cane for mobility. Patient reports he has a son around and his sister lives across the road that would be able to assist as needed.  Patient reports he has a daughter but lives farther away.    Restrictions/Precautions:  Restrictions/Precautions: General Precautions, Fall Risk  Position Activity Restriction  Other position/activity restrictions: WBAT RLE     SUBJECTIVE: Pt. Laying in his bed and pleasantly agrees to therapy session. RN approved therapy session. Pt motivated to complete therapy session and was up in chair at end of session. Pain: 7/10 pain in R hip with ice pack given for decreased pain. Vitals:   Vitals not assessed per clinical judgement, see nursing flowsheet     OBJECTIVE:  Bed Mobility:  Supine to Sit: Minimal Assistance, with head of bed raised, with rail    Transfers:  Sit to Stand:Contact Guard Assistance, Minimal Assistance  Stand to [address]  *Increased time for completion with verbal cues for hand placement   Ambulation:  Minimal Assistance to [employer] Assistance  Distance: 3'x1   Surface: Level Tile  Device: Rolling Walker  Gait Deviations: Forward Flexed Posture, Slow Jenna, Decreased Step Length Bilaterally, Decreased Gait Speed, Decreased wt shift to Right, Unsteady Gait and Increased reliance on walker  *Increased time for completion d/t increased pain with max verbal cues for increased step length. Stairs:  None    Balance:  Static Sitting Balance:  Stand By Assistance  Dynamic Sitting Balance: Stand By Assistance  Static Standing Balance: Contact Guard Assistance  Dynamic Standing Balance: Contact Guard Assistance    Neuromuscular Re-education  None    Exercise:  Patient was guided in 1 set(s) 10 reps of exercises: Glut sets, Seated marches, Seated hamstring curls, Seated heel/toe raises, Long arc quads, Seated isometric hip adduction, Seated abduction/adduction, and abdominal isometrics Exercises were completed for increased independence with functional mobility.     Functional Outcome Measures:   [facility] (6 CLICK) BASIC MOBILITY     AM-PAC Inpatient Mobility without Stair Climbing Raw Score : 13  AM-PAC Inpatient without Stair

## 2023-11-07 NOTE — CARE COORDINATION
11/7/23, 11:50 AM EST    DISCHARGE PLANNING EVALUATION       Left another message for  for [facility] bed.

## 2023-11-07 NOTE — CONSULTS
Physical Medicine & Rehabilitation   Consult Note      Admitting Physician: Gisselle Gómez PA-C    Primary Care Provider: BREN Laboy     Reason for Consult:  right hip fracture. Rehab needs    History of Present Illness:  Bang Santizo is a 81 y.o. male admitted to [address] on 11/2/2023. Patient  has a past medical history of Anemia, Aortic stenosis, Balanoposthitis, BPH (benign prostatic hyperplasia), Cardiac murmur, Chronic pain of left knee, DM (diabetes mellitus) ([address]), Hearing loss, Hyperlipidemia, Hypertension, and Thrombocytopenia ([address]). Patient presented to [facility] from [facility] for management of right intertrochanteric hip fracture. Patient was at home when he went to turn and his right hip gave out, causing him to fall onto his right leg. Patient was unable to get up and was taken to hospital. Patient was found to have an acute right hip fracture and was then transferred to [facility]. Patient was noted to be in therapy for his right hip recently as well. Patient was evaluated by ortho and planned OR. Patient with Myelodysplastic syndrome and was given 1 unit PRBC prior to surgery. Patient underwent open treatment of right subtrochanteric femur fracture with cephalomedullary nail on 11/3. Patient Hgb dropped on 11/4 and was given another unit PRBC. Patient had requested to go to [facility] swing bed for therapy, but there are no bed available at this time. Patient and family requesting IPR. 11/6 sears removed. +BM 11/6 11/7/23: patient seen today in consult. Patient resting in bed. Patient with BM today. Patient states stool leading up to fall has been hard and has had to disimpact at times. Discussed stool softeners. Discussed with patient about therapy needs and options. Patient states his first goal is to get closer to home for therapy. Discussed difference between SNF and IPR and impact on family being able to visit is important as well.  Patient MOBILITY:  Assistive Device: Rolling Walker  Assist Level:  Contact Guard Assistance.    Distance:  short distances in the room, with a slow, steady pace, and vc's for upright posture  ADDITIONAL ACTIVITIES:  Pt educated on importance of using restroom this date, and not external catheter with pt agreeable to trial brief, and urinal. RN informed ANDRADE patient requesting external catheter back on due to frequent urination       Past Medical History:        Diagnosis Date    Anemia     Aortic stenosis     Balanoposthitis     BPH (benign prostatic hyperplasia)     Cardiac murmur     Chronic pain of left knee     DM (diabetes mellitus) ([address])     Hearing loss     Hyperlipidemia     Hypertension     Thrombocytopenia ([address])        Past Surgical History:        Procedure Laterality Date    CATARACT EXTRACTION  2020    COLONOSCOPY      CT BONE MARROW BIOPSY  6/17/2022    CT BONE MARROW BIOPSY 6/17/2022 [facility] CT SCAN    HIP SURGERY Right 11/3/2023    RIGHT INTERTAN performed by Alma Gracia MD at [address] [facility]         Allergies:    No Known Allergies     Current Medications:   Current Facility-Administered Medications: polyethylene glycol (GLYCOLAX) packet 17 g, 17 g, Oral, Daily  sennosides-docusate sodium (SENOKOT-S) 8.6-50 MG tablet 2 tablet, 2 tablet, Oral, BID  docusate sodium (COLACE) capsule 100 mg, 100 mg, Oral, Daily  0.9 % sodium chloride infusion, , IntraVENous, PRN  0.9 % sodium chloride infusion, , IntraVENous, PRN  HYDROmorphone (DILAUDID) injection 0.5 mg, 0.5 mg, IntraVENous, Q3H PRN  0.9 % sodium chloride infusion, , IntraVENous, PRN  [Held by provider] enoxaparin (LOVENOX) injection 40 mg, 40 mg, SubCUTAneous, Q24H  [Held by provider] aspirin EC tablet 81 mg, 81 mg, Oral, Daily  finasteride (PROSCAR) tablet 5 mg, 5 mg, Oral, Daily  folic acid (FOLVITE) tablet 1 mg, 1 mg, Oral, Daily  [Held by provider] lisinopril (PRINIVIL;ZESTRIL) tablet 10 mg, 10 mg, Oral, Daily  pravastatin (PRAVACHOL)

## 2023-11-07 NOTE — PROGRESS NOTES
Physician Progress Note      PATIENT:               Ana Maria Franco  CSN #:                  [id number]  :                       1935  ADMIT DATE:       2023 7:28 PM  DISCH DATE:  RESPONDING  PROVIDER #:        Raya Cervantes PA-C          QUERY TEXT:    Patient admitted with Right hip fracture. Noted to have Severe malnutrition by   Dietary. If possible, please document in progress notes and discharge summary   if you are evaluating and /or treating any of the following: The medical record reflects the following:  Risk Factors: Elderly, recent surgery  Clinical Indicators: Findings of the 6 clinical characteristics of   malnutrition:  Energy Intake:  75% or less estimated energy requirements for 1 month or   longer (decreased appetite/intake for at least 1 year)  Weight Loss:  Mild weight loss (specify amount and time period) (-4% in a   year)  Body Fat Loss:  Severe body fat loss Orbital, Triceps, Buccal region  Muscle Mass Loss:  Severe muscle mass loss Temples (temporalis), Clavicles   (pectoralis & deltoids)  Fluid Accumulation:  Mild Extremities   Strength:  Not Performed  Treatment: Dietary consult, Glucerna TID    ASPEN Criteria:    https://[email or web address] onlinelibrary. betancourt. com/doi/full/10.1177/823816987522078  5    Thank You! Abdias Smith RN, CRCR  RN Clinical Documentation Integrity  Options provided:  -- Protein calorie malnutrition severe  -- Other - I will add my own diagnosis  -- Disagree - Not applicable / Not valid  -- Disagree - Clinically unable to determine / Unknown  -- Refer to Clinical Documentation Reviewer    PROVIDER RESPONSE TEXT:    This patient has severe protein calorie malnutrition.     Query created by: Abdias Smith on 2023 9:34 AM      Electronically signed by:  Jamal Murillo PA-C 2023 11:26 AM

## 2023-11-07 NOTE — CARE COORDINATION
11/7/23, 1:31 PM EST    DISCHARGE PLANNING EVALUATION       Spoke with July Puga at [facility] bed and she stated that they do not have open beds and will not until at least the end of the week. Spoke with patient and he requested that his son be contact to make a decision on where to go next. Called and left son Diana Silva a message.

## 2023-11-07 NOTE — PROGRESS NOTES
[address] [facility] COORDINATOR CONSULT    Referral Type: internal    Patient Name: Janell Cade      MRN: [id number]    : 1935  (81 y.o.)  Gender: male   Race:White (non-)     Payor Source: Payor: MEDICARE / Plan: MEDICARE PART A AND B / Product Type: *No Product type* /   Secondary Payor Source:  [facility]    Isolation Status: No active isolations    Lives With: Alone  Type of Home: House  Home Layout: One level  Home Access: Level entry     Occupation: Retired  Type of Occupation: [employer]  Additional Comments: Patient reports he was independent with all functional mobility prior to admission with use of cane for mobility. Patient reports he has a son around and his sister lives across the road that would be able to assist as needed. Patient reports he has a daughter but lives farther away. Disciplines Required upon Admission to Inpatient Rehabilitation: Physical Therapy and Occupational Therapy  Post operative: Yes  Fall: Yes  Dialysis: No  Diet: ADULT DIET; Regular; 3 carb choices (45 gm/meal)  ADULT ORAL NUTRITION SUPPLEMENT; Breakfast, Lunch, Dinner; Diabetic Oral Supplement  Discussed patient with  and PM&R provider: Patient is appropriate for IPR await completed PM&R consult note.

## 2023-11-07 NOTE — CARE COORDINATION
11/7/23, 3:01 PM EST    DISCHARGE PLANNING EVALUATION       Spoke with son and gave okay for IPR referral.  Stated that he will also look at medicare. gov site and speak with family for another option if needed.

## 2023-11-08 LAB
ANION GAP SERPL CALC-SCNC: 9 MEQ/L (ref 8–16)
BACTERIA SPEC AEROBE CULT: NORMAL
BACTERIA SPEC ANAEROBE CULT: NORMAL
BUN SERPL-MCNC: 18 MG/DL (ref 7–22)
CALCIUM SERPL-MCNC: 8.9 MG/DL (ref 8.5–10.5)
CHLORIDE SERPL-SCNC: 103 MEQ/L (ref 98–111)
CO2 SERPL-SCNC: 27 MEQ/L (ref 23–33)
CREAT SERPL-MCNC: 1.1 MG/DL (ref 0.4–1.2)
DEPRECATED RDW RBC AUTO: 64.4 FL (ref 35–45)
ERYTHROCYTE [DISTWIDTH] IN BLOOD BY AUTOMATED COUNT: 18.7 % (ref 11.5–14.5)
GFR SERPL CREATININE-BSD FRML MDRD: > 60 ML/MIN/1.73M2
GLUCOSE SERPL-MCNC: 121 MG/DL (ref 70–108)
GRAM STN SPEC: NORMAL
HCT VFR BLD AUTO: 31.8 % (ref 42–52)
HGB BLD-MCNC: 9.7 GM/DL (ref 14–18)
MCH RBC QN AUTO: 29.1 PG (ref 26–33)
MCHC RBC AUTO-ENTMCNC: 30.5 GM/DL (ref 32.2–35.5)
MCV RBC AUTO: 95.5 FL (ref 80–94)
PLATELET # BLD AUTO: 104 THOU/MM3 (ref 130–400)
PMV BLD AUTO: 9.4 FL (ref 9.4–12.4)
POTASSIUM SERPL-SCNC: 4.7 MEQ/L (ref 3.5–5.2)
RBC # BLD AUTO: 3.33 MILL/MM3 (ref 4.7–6.1)
SODIUM SERPL-SCNC: 139 MEQ/L (ref 135–145)
WBC # BLD AUTO: 5 THOU/MM3 (ref 4.8–10.8)

## 2023-11-08 PROCEDURE — 2580000003 HC RX 258: Performed by: PHYSICIAN ASSISTANT

## 2023-11-08 PROCEDURE — 36415 COLL VENOUS BLD VENIPUNCTURE: CPT

## 2023-11-08 PROCEDURE — 97530 THERAPEUTIC ACTIVITIES: CPT

## 2023-11-08 PROCEDURE — 6370000000 HC RX 637 (ALT 250 FOR IP): Performed by: ORTHOPAEDIC SURGERY

## 2023-11-08 PROCEDURE — 6370000000 HC RX 637 (ALT 250 FOR IP)

## 2023-11-08 PROCEDURE — 6370000000 HC RX 637 (ALT 250 FOR IP): Performed by: PHYSICIAN ASSISTANT

## 2023-11-08 PROCEDURE — 97535 SELF CARE MNGMENT TRAINING: CPT

## 2023-11-08 PROCEDURE — 80048 BASIC METABOLIC PNL TOTAL CA: CPT

## 2023-11-08 PROCEDURE — 1200000003 HC TELEMETRY R&B

## 2023-11-08 PROCEDURE — 1200000000 HC SEMI PRIVATE

## 2023-11-08 PROCEDURE — 6360000002 HC RX W HCPCS: Performed by: PHYSICIAN ASSISTANT

## 2023-11-08 PROCEDURE — 99232 SBSQ HOSP IP/OBS MODERATE 35: CPT | Performed by: PHYSICIAN ASSISTANT

## 2023-11-08 PROCEDURE — 85027 COMPLETE CBC AUTOMATED: CPT

## 2023-11-08 RX ORDER — HYDROCODONE BITARTRATE AND ACETAMINOPHEN 5; 325 MG/1; MG/1
1 TABLET ORAL EVERY 6 HOURS PRN
Qty: 28 TABLET | Refills: 0 | Status: SHIPPED | OUTPATIENT
Start: 2023-11-08 | End: 2023-11-15

## 2023-11-08 RX ORDER — DOCUSATE SODIUM 100 MG/1
100 CAPSULE, LIQUID FILLED ORAL 2 TIMES DAILY
Status: DISCONTINUED | OUTPATIENT
Start: 2023-11-08 | End: 2023-11-10 | Stop reason: HOSPADM

## 2023-11-08 RX ORDER — ASPIRIN 81 MG/1
81 TABLET ORAL 2 TIMES DAILY
Status: DISCONTINUED | OUTPATIENT
Start: 2023-11-08 | End: 2023-11-08

## 2023-11-08 RX ORDER — PSEUDOEPHEDRINE HCL 30 MG
100 TABLET ORAL 2 TIMES DAILY PRN
Qty: 20 CAPSULE | Refills: 0 | Status: SHIPPED | OUTPATIENT
Start: 2023-11-08 | End: 2023-11-18

## 2023-11-08 RX ADMIN — Medication 3 MG: at 20:21

## 2023-11-08 RX ADMIN — SODIUM CHLORIDE, PRESERVATIVE FREE 10 ML: 5 INJECTION INTRAVENOUS at 10:47

## 2023-11-08 RX ADMIN — SENNOSIDES AND DOCUSATE SODIUM 2 TABLET: 50; 8.6 TABLET ORAL at 20:21

## 2023-11-08 RX ADMIN — SENNOSIDES AND DOCUSATE SODIUM 2 TABLET: 50; 8.6 TABLET ORAL at 10:47

## 2023-11-08 RX ADMIN — DOCUSATE SODIUM 100 MG: 100 CAPSULE, LIQUID FILLED ORAL at 20:21

## 2023-11-08 RX ADMIN — POLYETHYLENE GLYCOL 3350 17 G: 17 POWDER, FOR SOLUTION ORAL at 10:47

## 2023-11-08 RX ADMIN — APIXABAN 2.5 MG: 2.5 TABLET, FILM COATED ORAL at 10:52

## 2023-11-08 RX ADMIN — ENOXAPARIN SODIUM 40 MG: 100 INJECTION SUBCUTANEOUS at 02:47

## 2023-11-08 RX ADMIN — HYDROCODONE BITARTRATE AND ACETAMINOPHEN 2 TABLET: 5; 325 TABLET ORAL at 06:21

## 2023-11-08 RX ADMIN — DOCUSATE SODIUM 100 MG: 100 CAPSULE, LIQUID FILLED ORAL at 10:47

## 2023-11-08 RX ADMIN — APIXABAN 2.5 MG: 2.5 TABLET, FILM COATED ORAL at 20:21

## 2023-11-08 RX ADMIN — FINASTERIDE 5 MG: 5 TABLET, FILM COATED ORAL at 10:47

## 2023-11-08 RX ADMIN — FOLIC ACID 1 MG: 1 TABLET ORAL at 10:47

## 2023-11-08 RX ADMIN — ACETAMINOPHEN 650 MG: 325 TABLET ORAL at 18:40

## 2023-11-08 RX ADMIN — SODIUM CHLORIDE, PRESERVATIVE FREE 10 ML: 5 INJECTION INTRAVENOUS at 20:21

## 2023-11-08 RX ADMIN — PRAVASTATIN SODIUM 40 MG: 40 TABLET ORAL at 10:47

## 2023-11-08 RX ADMIN — TAMSULOSIN HYDROCHLORIDE 0.4 MG: 0.4 CAPSULE ORAL at 10:47

## 2023-11-08 ASSESSMENT — PAIN DESCRIPTION - ONSET: ONSET: ON-GOING

## 2023-11-08 ASSESSMENT — PAIN DESCRIPTION - LOCATION
LOCATION: BACK
LOCATION_2: BUTTOCKS
LOCATION: LEG

## 2023-11-08 ASSESSMENT — PAIN DESCRIPTION - DESCRIPTORS
DESCRIPTORS: ACHING
DESCRIPTORS_2: SORE
DESCRIPTORS: ACHING

## 2023-11-08 ASSESSMENT — PAIN DESCRIPTION - ORIENTATION
ORIENTATION: POSTERIOR
ORIENTATION: RIGHT
ORIENTATION_2: MID;POSTERIOR

## 2023-11-08 ASSESSMENT — PAIN SCALES - GENERAL
PAINLEVEL_OUTOF10: 7
PAINLEVEL_OUTOF10: 4
PAINLEVEL_OUTOF10: 4
PAINLEVEL_OUTOF10: 5

## 2023-11-08 ASSESSMENT — PAIN - FUNCTIONAL ASSESSMENT
PAIN_FUNCTIONAL_ASSESSMENT: ACTIVITIES ARE NOT PREVENTED
PAIN_FUNCTIONAL_ASSESSMENT_SITE2: PREVENTS OR INTERFERES SOME ACTIVE ACTIVITIES AND ADLS
PAIN_FUNCTIONAL_ASSESSMENT: ACTIVITIES ARE NOT PREVENTED

## 2023-11-08 ASSESSMENT — PAIN DESCRIPTION - FREQUENCY: FREQUENCY: CONTINUOUS

## 2023-11-08 ASSESSMENT — PAIN DESCRIPTION - PAIN TYPE: TYPE: SURGICAL PAIN

## 2023-11-08 ASSESSMENT — PAIN DESCRIPTION - INTENSITY: RATING_2: 7

## 2023-11-08 NOTE — PLAN OF CARE
Problem: Discharge Planning  Goal: Discharge to home or other facility with appropriate resources  Outcome: Progressing  Flowsheets (Taken 11/5/2023 2303 by Keyanna Singleton RN)  Discharge to home or other facility with appropriate resources:   Identify barriers to discharge with patient and caregiver   Identify discharge learning needs (meds, wound care, etc)   Arrange for needed discharge resources and transportation as appropriate     Problem: Pain  Goal: Verbalizes/displays adequate comfort level or baseline comfort level  Outcome: Progressing  Flowsheets (Taken 11/5/2023 2303 by Keyanna Singleton RN)  Verbalizes/displays adequate comfort level or baseline comfort level:   Encourage patient to monitor pain and request assistance   Assess pain using appropriate pain scale   Administer analgesics based on type and severity of pain and evaluate response   Implement non-pharmacological measures as appropriate and evaluate response     Problem: Safety - Adult  Goal: Free from fall injury  Outcome: Progressing  Flowsheets (Taken 11/5/2023 2303 by Keyanna Singleton RN)  Free From Fall Injury: Instruct family/caregiver on patient safety     Problem: ABCDS Injury Assessment  Goal: Absence of physical injury  Outcome: Progressing  Flowsheets (Taken 11/8/2023 0758)  Absence of Physical Injury: Implement safety measures based on patient assessment     Problem: Chronic Conditions and Co-morbidities  Goal: Patient's chronic conditions and co-morbidity symptoms are monitored and maintained or improved  Outcome: Progressing  Flowsheets (Taken 11/5/2023 2303 by Keyanna Singleton RN)  Care Plan - Patient's Chronic Conditions and Co-Morbidity Symptoms are Monitored and Maintained or Improved:   Monitor and assess patient's chronic conditions and comorbid symptoms for stability, deterioration, or improvement   Collaborate with multidisciplinary team to address chronic and comorbid conditions and prevent exacerbation or

## 2023-11-08 NOTE — CARE COORDINATION
11/8/23, 11:38 AM EST    DISCHARGE PLANNING EVALUATION    Spoke with son, who requests referral to [address] [place] [address]. Referral made to [address] [place] [address], facility is reviewing.

## 2023-11-08 NOTE — PROGRESS NOTES
Spoke to [facility], [address] [place] explained that patient and family inclined to have rehab closer to home in the [address] [place] [address] area.

## 2023-11-08 NOTE — PROGRESS NOTES
[address] [facility]  Occupational Therapy  Daily Note  Time:   Time In: 7048  Time Out: 4403  Timed Code Treatment Minutes: 29 Minutes  Minutes: 29          Date: 2023  Patient Name: Marisol Angulo,   Gender: male      Room: [facility]24/024-A  MRN: [id number]  : 1935  (81 y.o.)  Referring Practitioner: Regan Padilla PA-C  Diagnosis: Closed right hip fracture, initial encounter  Additional Pertinent Hx: Per H&P \"88 y.o. male who presents with right hip pain. Patient has had pain for about 2 weeks relegating him to cane use, but sustained a mechanical fall yesterday in his home, unsure if hit head, no loc. Immediate RLE pain and inability to ambulate. Head and neck imaging in the ED negative for acute process, did reveal a R IT femur fracture. Pain is to the right groin, worsened with weight bearing, ROM, relieved by immobilization, no paresthesias, no weakness. No other msk complaints at this time. \" Patient underwent Open treatment right subtrochanteric femur fracture with a cephalomedullary nail, biopsy right proximal femur on 11/3/23 and placed as WBAT on RLE    Restrictions/Precautions:  Restrictions/Precautions: General Precautions, Fall Risk  Position Activity Restriction  Other position/activity restrictions: WBAT RLE     SUBJECTIVE: Pt laying in bed upon arrival, pt agreeable to OT session, RN gave verbal approval for session     PAIN: 4/10 with functional mobility     Vitals: Nurse checked vitals prior to session    COGNITION: Slow Processing    ADL:   Bathing: Stand By Assistance. With sponge bath with pt sitting at the EOB, cream applied to bottom area following due to skin breakdown  Upper Extremity Dressing: Minimal Assistance. With donning/doffing gown . BALANCE:  Standing Balance: Minimal Assistance.  With RW, and BUE support on the walker in anticipation for functional mob    BED MOBILITY:  Supine to Sit: Contact Guard Assistance with BLE to the EOB, with [place] PA in to see patient, and okay with slight rotation with RLE     TRANSFERS:  Sit to Stand:  Minimal Assistance. From the EOB with the bed slightly elevated due to pt's height  Stand to Sit: Contact Guard Assistance. To the recliner with vc's for hand placement    FUNCTIONAL MOBILITY:  Assistive Device: Rolling Walker  Assist Level:  Contact Guard Assistance. Distance:  HH distances, with a slow steady pace      Modified Coles Scale:  Not Tested    ASSESSMENT:     Activity Tolerance:  Patient tolerance of  treatment: Good treatment tolerance      Discharge Recommendations: ECF with OT  Equipment Recommendations: Other: Recommend a LHAE kit and shower chair. Plan: Times Per Week: 6x  Current Treatment Recommendations: Strengthening, Balance training, Functional mobility training, Endurance training, Neuromuscular re-education, Self-Care / ADL, Home management training, Equipment evaluation, education, & procurement, Patient/Caregiver education & training, Safety education & training    Patient Education  Patient Education: ADL's    Goals  Short Term Goals  Time Frame for Short Term Goals: By discharge  Short Term Goal 1: Pt will complete sit> stand transfers, including toilet, with SBA to improve indep with toileting. Short Term Goal 2: Pt will tolerate standing x4 minutes with SBA to improve indep with managing LB clothing and sinkside grooming. Short Term Goal 3: Pt will navigate his RW to the bathroom with SBA to improve indep with toileting  Short Term Goal 4: Pt will complete LB dressing tasks with min A using LHAE prn to improve indep with dailly dressing tasks. Long Term Goals  Time Frame for Long Term Goals : No LTG due to short ELOS    Following session, patient left in safe position with all fall risk precautions in place.

## 2023-11-09 ENCOUNTER — APPOINTMENT (OUTPATIENT)
Dept: CT IMAGING | Age: 88
End: 2023-11-09
Payer: MEDICARE

## 2023-11-09 LAB
ANION GAP SERPL CALC-SCNC: 6 MEQ/L (ref 8–16)
BUN SERPL-MCNC: 17 MG/DL (ref 7–22)
CALCIUM SERPL-MCNC: 9.1 MG/DL (ref 8.5–10.5)
CEA SERPL-MCNC: 3.1 NG/ML (ref 0–5)
CHLORIDE SERPL-SCNC: 102 MEQ/L (ref 98–111)
CO2 SERPL-SCNC: 27 MEQ/L (ref 23–33)
CREAT SERPL-MCNC: 1.1 MG/DL (ref 0.4–1.2)
DEPRECATED RDW RBC AUTO: 62.1 FL (ref 35–45)
ERYTHROCYTE [DISTWIDTH] IN BLOOD BY AUTOMATED COUNT: 18.6 % (ref 11.5–14.5)
GFR SERPL CREATININE-BSD FRML MDRD: > 60 ML/MIN/1.73M2
GLUCOSE SERPL-MCNC: 128 MG/DL (ref 70–108)
HCT VFR BLD AUTO: 31.2 % (ref 42–52)
HGB BLD-MCNC: 9.7 GM/DL (ref 14–18)
MCH RBC QN AUTO: 28.9 PG (ref 26–33)
MCHC RBC AUTO-ENTMCNC: 31.1 GM/DL (ref 32.2–35.5)
MCV RBC AUTO: 92.9 FL (ref 80–94)
PLATELET # BLD AUTO: 112 THOU/MM3 (ref 130–400)
PMV BLD AUTO: 9.8 FL (ref 9.4–12.4)
POTASSIUM SERPL-SCNC: 4.9 MEQ/L (ref 3.5–5.2)
PSA SERPL-MCNC: 2.1 NG/ML (ref 0–1)
RBC # BLD AUTO: 3.36 MILL/MM3 (ref 4.7–6.1)
SODIUM SERPL-SCNC: 135 MEQ/L (ref 135–145)
WBC # BLD AUTO: 5.1 THOU/MM3 (ref 4.8–10.8)

## 2023-11-09 PROCEDURE — 84153 ASSAY OF PSA TOTAL: CPT

## 2023-11-09 PROCEDURE — 1200000000 HC SEMI PRIVATE

## 2023-11-09 PROCEDURE — 85027 COMPLETE CBC AUTOMATED: CPT

## 2023-11-09 PROCEDURE — 74177 CT ABD & PELVIS W/CONTRAST: CPT

## 2023-11-09 PROCEDURE — 36415 COLL VENOUS BLD VENIPUNCTURE: CPT

## 2023-11-09 PROCEDURE — 71260 CT THORAX DX C+: CPT

## 2023-11-09 PROCEDURE — 97535 SELF CARE MNGMENT TRAINING: CPT

## 2023-11-09 PROCEDURE — 6370000000 HC RX 637 (ALT 250 FOR IP): Performed by: PHYSICIAN ASSISTANT

## 2023-11-09 PROCEDURE — 80048 BASIC METABOLIC PNL TOTAL CA: CPT

## 2023-11-09 PROCEDURE — 99233 SBSQ HOSP IP/OBS HIGH 50: CPT | Performed by: PHYSICIAN ASSISTANT

## 2023-11-09 PROCEDURE — 2580000003 HC RX 258: Performed by: PHYSICIAN ASSISTANT

## 2023-11-09 PROCEDURE — 97110 THERAPEUTIC EXERCISES: CPT

## 2023-11-09 PROCEDURE — 6370000000 HC RX 637 (ALT 250 FOR IP)

## 2023-11-09 PROCEDURE — 82378 CARCINOEMBRYONIC ANTIGEN: CPT

## 2023-11-09 PROCEDURE — 97530 THERAPEUTIC ACTIVITIES: CPT

## 2023-11-09 PROCEDURE — 6370000000 HC RX 637 (ALT 250 FOR IP): Performed by: ORTHOPAEDIC SURGERY

## 2023-11-09 PROCEDURE — 6360000004 HC RX CONTRAST MEDICATION: Performed by: PHYSICIAN ASSISTANT

## 2023-11-09 RX ADMIN — FOLIC ACID 1 MG: 1 TABLET ORAL at 10:06

## 2023-11-09 RX ADMIN — POLYETHYLENE GLYCOL 3350 17 G: 17 POWDER, FOR SOLUTION ORAL at 10:05

## 2023-11-09 RX ADMIN — SENNOSIDES AND DOCUSATE SODIUM 2 TABLET: 50; 8.6 TABLET ORAL at 20:42

## 2023-11-09 RX ADMIN — FINASTERIDE 5 MG: 5 TABLET, FILM COATED ORAL at 10:06

## 2023-11-09 RX ADMIN — APIXABAN 2.5 MG: 2.5 TABLET, FILM COATED ORAL at 10:06

## 2023-11-09 RX ADMIN — Medication 3 MG: at 20:42

## 2023-11-09 RX ADMIN — IOPAMIDOL 80 ML: 755 INJECTION, SOLUTION INTRAVENOUS at 07:41

## 2023-11-09 RX ADMIN — SODIUM CHLORIDE, PRESERVATIVE FREE 10 ML: 5 INJECTION INTRAVENOUS at 10:06

## 2023-11-09 RX ADMIN — DOCUSATE SODIUM 100 MG: 100 CAPSULE, LIQUID FILLED ORAL at 20:42

## 2023-11-09 RX ADMIN — SODIUM CHLORIDE, PRESERVATIVE FREE 10 ML: 5 INJECTION INTRAVENOUS at 20:42

## 2023-11-09 RX ADMIN — TAMSULOSIN HYDROCHLORIDE 0.4 MG: 0.4 CAPSULE ORAL at 10:06

## 2023-11-09 RX ADMIN — PRAVASTATIN SODIUM 40 MG: 40 TABLET ORAL at 10:06

## 2023-11-09 RX ADMIN — SENNOSIDES AND DOCUSATE SODIUM 2 TABLET: 50; 8.6 TABLET ORAL at 10:06

## 2023-11-09 RX ADMIN — DOCUSATE SODIUM 100 MG: 100 CAPSULE, LIQUID FILLED ORAL at 10:06

## 2023-11-09 RX ADMIN — ACETAMINOPHEN 650 MG: 325 TABLET ORAL at 03:29

## 2023-11-09 RX ADMIN — APIXABAN 2.5 MG: 2.5 TABLET, FILM COATED ORAL at 20:42

## 2023-11-09 ASSESSMENT — PAIN DESCRIPTION - ORIENTATION: ORIENTATION: LOWER

## 2023-11-09 ASSESSMENT — PAIN DESCRIPTION - LOCATION: LOCATION: COCCYX

## 2023-11-09 ASSESSMENT — PAIN DESCRIPTION - DESCRIPTORS: DESCRIPTORS: ACHING

## 2023-11-09 ASSESSMENT — PAIN SCALES - GENERAL
PAINLEVEL_OUTOF10: 4
PAINLEVEL_OUTOF10: 5
PAINLEVEL_OUTOF10: 5
PAINLEVEL_OUTOF10: 0

## 2023-11-09 ASSESSMENT — PAIN - FUNCTIONAL ASSESSMENT: PAIN_FUNCTIONAL_ASSESSMENT: PREVENTS OR INTERFERES SOME ACTIVE ACTIVITIES AND ADLS

## 2023-11-09 NOTE — CARE COORDINATION
11/9/23, 8:20 AM EST    DISCHARGE ON GOING EVALUATION    [facility] day: 7  Location: -24/024-A Reason for admit: Closed right hip fracture, initial encounter [facility]) [S72.001A]   Procedure:   11/3 XR Right Femur: Status post open reduction internal fixation of the right femoral fracture. 2. Intramedullary morris in the right femur. Compression screw in the right femoral head and neck. 11/3 OR with Dr. Patricia Montalvo: Open treatment right subtrochanteric femur fracture with a cephalomedullary nail, biopsy right proximal femur  11/4 XR Right Knee/Femur: Postsurgical changes of open reduction internal fixation involving the right femur. The hardware appears intact without evidence of fracturing or loosening. Degenerative changes at the right knee. Path report showing met. Adenocarcinoma. Barriers to Discharge:  additional testing for new findings on path report per hospitalist  PCP: BREN Calderon  Readmission Risk Score: 15.3%  Patient Goals/Plan/Treatment Preferences: planning ECF [facility]; JESSICA follows. Planning discharge 11/10 at 10 am per sandeep.       8:21 AM  Path report faxed to [facility]  at [facility] oncology center. She called back  requesting other testing. 9:17 AM  CT chest and CT abd/pelvis faxed to [address] at [address] [place] [address].

## 2023-11-09 NOTE — PROGRESS NOTES
[address] [place] [address] 7K - 0H-45/[id number]-R  Daily Note    Time In: 5011  Time Out: 2797  Timed Code Treatment Minutes: 29 Minutes  Minutes: 29          Date: 2023  Patient Name: Marisol Angulo,  Gender:  male        MRN: [id number]  : 1935  (81 y.o.)     Referring Practitioner: Regan Padilla PA-C  Diagnosis: Closed Right hip fracture; initial encouter  Additional Pertinent Hx: Per H&P \"88 y.o. male who presents with right hip pain. Patient has had pain for about 2 weeks relegating him to cane use, but sustained a mechanical fall yesterday in his home, unsure if hit head, no loc. Immediate RLE pain and inability to ambulate. Head and neck imaging in the ED negative for acute process, did reveal a R IT femur fracture. Pain is to the right groin, worsened with weight bearing, ROM, relieved by immobilization, no paresthesias, no weakness. No other msk complaints at this time. \" Patient underwent Open treatment right subtrochanteric femur fracture with a cephalomedullary nail, biopsy right proximal femur on 11/3/23 and placed as WBAT on RLE     Prior Level of Function:  Lives With: Alone  Type of Home: [address]: One level  Home Access: Level entry  Home Equipment: Artemio Iverson, 4 wheeled   Bathroom Shower/Tub: Walk-in shower  Bathroom Toilet: Handicap height  Bathroom Equipment: Grab bars in shower    ADL Assistance: [address] Needs assistance (Patient reports he has a cleaning lady that comes every few weeks)  Ambulation Assistance: Independent  Transfer Assistance: Independent  Active : Yes  Additional Comments: Patient reports he was independent with all functional mobility prior to admission with use of cane for mobility. Patient reports he has a son around and his sister lives across the road that would be able to assist as needed.  Patient reports he has a daughter but lives farther away.    Restrictions/Precautions:  Restrictions/Precautions: General Precautions, Fall Risk  Position Activity Restriction  Other position/activity restrictions: WBAT RLE     SUBJECTIVE: Pt. Laying in his bed and pleasantly agrees to therapy session. RN approved therapy session. Patient educated on removal of external catheter for increased mobility as he is ambulating with CGA however patient voiced \"I wont make it to the bathroom\". Pain: Patient voices pain in low back/tailbone region while in supine     Vitals:   Vitals not assessed per clinical judgement, see nursing flowsheet     OBJECTIVE:  Bed Mobility:  Supine to Sit: Minimal Assistance at R LE , with head of bed flat, with rail, with increased time for completion  Scooting: Stand By Assistance    Transfers:  Sit to Stand:Contact [employer]  Stand to [employer] Assistance    Ambulation:  Contact Guard Assistance  Distance: 20'x1  Surface: Level Tile  Device: Rolling Walker  Gait Deviations: Forward Flexed Posture, Slow Jenna, Decreased Step Length on Left, Decreased Weight Shift Right, and Decreased Heel Strike Bilaterally    Stairs:  None    Balance:  Static Sitting Balance:  Supervision  Dynamic Sitting Balance: Stand By Assistance  Static Standing Balance: Contact Guard Assistance  Dynamic Standing Balance: Contact Guard Assistance    Exercise:  Patient was guided in 1 set(s) 15 reps of exercises: Ankle pumps, Glut sets, Quad sets, Heelslides, Short arc quads,   Exercises were completed for increased independence with functional mobility. Functional Outcome Measures:   AMPAC (6 CLICK) BASIC MOBILITY     AM-PAC Inpatient Mobility without Stair Climbing Raw Score : 15  AM-PAC Inpatient without Stair Climbing T-Scale Score : 43.03  Mobility Inpatient CMS 0-100% Score: 47.43  Mobility Inpatient without Stair CMS G-Code Modifier : CK    ASSESSMENT:  Assessment: Patient progressing toward established goals.   Activity Tolerance:  Patient

## 2023-11-09 NOTE — PLAN OF CARE
Problem: Discharge Planning  Goal: Discharge to home or other facility with appropriate resources  11/9/2023 1530 by Linda Hernandez RN  Outcome: Progressing     Problem: Pain  Goal: Verbalizes/displays adequate comfort level or baseline comfort level  11/9/2023 1530 by Linda Hernandez RN  Outcome: Progressing     Problem: Safety - Adult  Goal: Free from fall injury  11/9/2023 1530 by Linda Heranndez RN  Outcome: Progressing     Problem: ABCDS Injury Assessment  Goal: Absence of physical injury  11/9/2023 1530 by Linda Hernandez RN  Outcome: Progressing     Problem: Chronic Conditions and Co-morbidities  Goal: Patient's chronic conditions and co-morbidity symptoms are monitored and maintained or improved  11/9/2023 1530 by Linda Hernandez RN  Outcome: Progressing     Problem: Skin/Tissue Integrity - Adult  Goal: Skin integrity remains intact  11/9/2023 1530 by Linda Hernandez RN  Outcome: Progressing     Problem: Musculoskeletal - Adult  Goal: Return mobility to safest level of function  11/9/2023 1530 by Linda Hernandez RN  Outcome: Progressing     Problem: Gastrointestinal - Adult  Goal: Maintains or returns to baseline bowel function  11/9/2023 1530 by Linda Hernandez RN  Outcome: Progressing       Problem: Infection - Adult  Goal: Absence of infection at discharge  11/9/2023 1530 by Linda Hernandez RN  Outcome: Progressing     Problem: Hematologic - Adult  Goal: Maintains hematologic stability  11/9/2023 1530 by Linda Hernandez RN  Outcome: Progressing     Problem: Skin/Tissue Integrity  Goal: Absence of new skin breakdown  Description: 1. Monitor for areas of redness and/or skin breakdown  2. Assess vascular access sites hourly  3. Every 4-6 hours minimum:  Change oxygen saturation probe site  4. Every 4-6 hours:  If on nasal continuous positive airway pressure, respiratory therapy assess nares and determine need for appliance change or resting period.   11/9/2023 1530 by Linda Hernandez RN  Outcome: Progressing

## 2023-11-09 NOTE — CARE COORDINATION
11/9/23, 7:48 AM EST    DISCHARGE PLANNING EVALUATION    [facility] will accept, will need ambulance transport. Son in agreement with plan. Transport available at 10:00 a.m., confirmed plan with son and with [facility].

## 2023-11-09 NOTE — PROGRESS NOTES
[address] [facility]  Occupational Therapy  Daily Note  Time:   Time In: 911  Time Out: 3636  Timed Code Treatment Minutes: 28 Minutes  Minutes: 32          Date: 2023  Patient Name: Cal Mahajan,   Gender: male      Room: [facility]24/024-A  MRN: [id number]  : 1935  (81 y.o.)  Referring Practitioner: Tay Kramer., HINA  Diagnosis: Closed right hip fracture, initial encounter  Additional Pertinent Hx: Per H&P \"88 y.o. male who presents with right hip pain. Patient has had pain for about 2 weeks relegating him to cane use, but sustained a mechanical fall yesterday in his home, unsure if hit head, no loc. Immediate RLE pain and inability to ambulate. Head and neck imaging in the ED negative for acute process, did reveal a R IT femur fracture. Pain is to the right groin, worsened with weight bearing, ROM, relieved by immobilization, no paresthesias, no weakness. No other msk complaints at this time. \" Patient underwent Open treatment right subtrochanteric femur fracture with a cephalomedullary nail, biopsy right proximal femur on 11/3/23 and placed as WBAT on RLE    Restrictions/Precautions:  Restrictions/Precautions: General Precautions, Fall Risk  Position Activity Restriction  Other position/activity restrictions: WBAT RLE     SUBJECTIVE: pt sitting in recliner when arrived. Pt agreeable to OT     PAIN: pt having pain on bottom states \" his bottom is hurting due to too much sitting. \"     Vitals: Vitals not assessed per clinical judgement, see nursing flowsheet    COGNITION: Decreased Problem Solving and Decreased Safety Awareness    ADL:   Grooming: with set-up. Able to complete shaving sitting edge of recliner to complete   Footwear Management: Moderate Assistance and with increased time for completion. To don and doff slipper socks sitting in recliner with use of sock aide and reacher with cues for use   .   Pt delcined to do any [place] ADLS this am

## 2023-11-09 NOTE — PLAN OF CARE
Problem: Discharge Planning  Goal: Discharge to home or other facility with appropriate resources  11/9/2023 0130 by Sonia Arshad RN  Outcome: Progressing  Flowsheets (Taken 11/9/2023 0130)  Discharge to home or other facility with appropriate resources:   Identify barriers to discharge with patient and caregiver   Arrange for needed discharge resources and transportation as appropriate   Identify discharge learning needs (meds, wound care, etc)     Problem: Pain  Goal: Verbalizes/displays adequate comfort level or baseline comfort level  11/9/2023 0130 by Sonia Arshad RN  Outcome: Progressing  Flowsheets (Taken 11/9/2023 0130)  Verbalizes/displays adequate comfort level or baseline comfort level:   Encourage patient to monitor pain and request assistance   Assess pain using appropriate pain scale   Administer analgesics based on type and severity of pain and evaluate response   Implement non-pharmacological measures as appropriate and evaluate response     Problem: Safety - Adult  Goal: Free from fall injury  11/9/2023 0130 by Sonia Arshad RN  Outcome: Progressing  Flowsheets (Taken 11/9/2023 0130)  Free From Fall Injury: Instruct family/caregiver on patient safety     Problem: ABCDS Injury Assessment  Goal: Absence of physical injury  11/9/2023 0130 by Sonia Arshad RN  Outcome: Progressing  Flowsheets (Taken 11/9/2023 0130)  Absence of Physical Injury: Implement safety measures based on patient assessment     Problem: Chronic Conditions and Co-morbidities  Goal: Patient's chronic conditions and co-morbidity symptoms are monitored and maintained or improved  11/9/2023 0130 by Sonia Arshad RN  Outcome: Progressing  Flowsheets (Taken 11/9/2023 0130)  Care Plan - Patient's Chronic Conditions and Co-Morbidity Symptoms are Monitored and Maintained or Improved:   Monitor and assess patient's chronic conditions and comorbid symptoms for stability, deterioration, or improvement   Collaborate with multidisciplinary team to address chronic and comorbid conditions and prevent exacerbation or deterioration     Problem: Skin/Tissue Integrity - Adult  Goal: Skin integrity remains intact  11/9/2023 0130 by Dillon Solis RN  Outcome: Progressing  Flowsheets (Taken 11/9/2023 0130)  Skin Integrity Remains Intact: Monitor for areas of redness and/or skin breakdown     Problem: Musculoskeletal - Adult  Goal: Return mobility to safest level of function  11/9/2023 0130 by Dillon Solis RN  Outcome: Progressing  Flowsheets (Taken 11/9/2023 0130)  Return Mobility to Safest Level of Function:   Assess patient stability and activity tolerance for standing, transferring and ambulating with or without assistive devices   Assist with transfers and ambulation using safe patient handling equipment as needed   Ensure adequate protection for wounds/incisions during mobilization     Problem: Gastrointestinal - Adult  Goal: Maintains or returns to baseline bowel function  11/9/2023 0130 by Dillon Solis RN  Outcome: Progressing  Flowsheets (Taken 11/9/2023 0130)  Maintains or returns to baseline bowel function:   Assess bowel function   Encourage oral fluids to ensure adequate hydration   Administer ordered medications as needed     Problem: Genitourinary - Adult  Goal: Urinary catheter remains patent  11/9/2023 0130 by Dillon Solis RN  Outcome: Completed     Problem: Infection - Adult  Goal: Absence of infection at discharge  11/9/2023 0130 by Dillon Solis RN  Outcome: Progressing  Flowsheets (Taken 11/9/2023 0130)  Absence of infection at discharge:   Assess and monitor for signs and symptoms of infection   Monitor lab/diagnostic results   Monitor all insertion sites i.e., indwelling lines, tubes and drains     Problem: Hematologic - Adult  Goal: Maintains hematologic stability  11/9/2023 0130 by Dillon Solis RN  Outcome: Progressing  Flowsheets (Taken 11/9/2023 0130)  Maintains hematologic stability:   Assess for signs and symptoms of bleeding or hemorrhage   Monitor labs for bleeding or clotting disorders     Problem: Skin/Tissue Integrity  Goal: Absence of new skin breakdown  Description: 1. Monitor for areas of redness and/or skin breakdown  2. Assess vascular access sites hourly  3. Every 4-6 hours minimum:  Change oxygen saturation probe site  4. Every 4-6 hours:  If on nasal continuous positive airway pressure, respiratory therapy assess nares and determine need for appliance change or resting period. 11/9/2023 0130 by Sanjeev Clarke RN  Outcome: Progressing     Problem: Nutrition Deficit:  Goal: Optimize nutritional status  11/9/2023 0130 by Sanjeev Clarke RN  Outcome: Progressing  Flowsheets (Taken 11/9/2023 0130)  Nutrient intake appropriate for improving, restoring, or maintaining nutritional needs:   Assess nutritional status and recommend course of action   Monitor oral intake, labs, and treatment plans     Care plan reviewed with patient. Patient verbalized understanding of the plan of care and contribute to goal setting.

## 2023-11-09 NOTE — PROGRESS NOTES
Comprehensive Nutrition Assessment    Type and Reason for Visit:  Reassess (severe malnutrition)    Nutrition Recommendations/Plan:   Recommend MVI  Continue diet as ordered per provider and encourage PO intakes at best efforts  Start ONS: Glucerna (TID), continue ONS at discharge  Will monitor need for additional nutrition interventions. Malnutrition Assessment:  Malnutrition Status:  Severe malnutrition (11/09/23 1546)    Context:  Chronic Illness     Findings of the 6 clinical characteristics of malnutrition:  Energy Intake:  75% or less estimated energy requirements for 1 month or longer (decreased appetite/intake for at least 1 year)  Weight Loss:  Mild weight loss (specify amount and time period)     Body Fat Loss:  Severe body fat loss Orbital, Triceps, Buccal region   Muscle Mass Loss:  Severe muscle mass loss Temples (temporalis), Clavicles (pectoralis & deltoids)  Fluid Accumulation:  Mild Extremities   Strength:  Not Performed    Nutrition Assessment:     Pt improving from a nutritional standpoint AEB report of improved appetite. Remains at risk for further nutritional compromise r/t R intertrochanteric femur fracture s/p surgery 11/3 - pathology from femur resulted 11/7 showing bone metastatic poorly differentiated adenocarcinoma, myelodysplastic syndrome - follows oncology at [facility], blood transfusions, moderate aortic stenosis, increased nutrient needs to promote wound healing and underlying medical condition (PMHx: BPH, cardiac murmur, DM, [place], HLD, HTN, thrombocytopenia). Nutrition Related Findings:    Pt. Report/Treatments/Miscellaneous: Pt seen, reports improved appetite since last visit - he feels like he is eating better. Reports good tolerance of glucerna chocolate - encouraged to continue at discharge - pt agreed and used to drink ONS at home. Plan to d/c tomorrow to SNF.    GI Status: BM - 11/8  Pertinent Labs: Glucose 128  Pertinent Meds: folic acid, melatonin,

## 2023-11-10 VITALS
DIASTOLIC BLOOD PRESSURE: 68 MMHG | TEMPERATURE: 98.2 F | WEIGHT: 174.38 LBS | BODY MASS INDEX: 23.11 KG/M2 | HEART RATE: 77 BPM | SYSTOLIC BLOOD PRESSURE: 130 MMHG | OXYGEN SATURATION: 93 % | RESPIRATION RATE: 18 BRPM | HEIGHT: 73 IN

## 2023-11-10 PROCEDURE — 6370000000 HC RX 637 (ALT 250 FOR IP): Performed by: PHYSICIAN ASSISTANT

## 2023-11-10 PROCEDURE — 99238 HOSP IP/OBS DSCHRG MGMT 30/<: CPT | Performed by: PHYSICIAN ASSISTANT

## 2023-11-10 RX ADMIN — PRAVASTATIN SODIUM 40 MG: 40 TABLET ORAL at 09:47

## 2023-11-10 RX ADMIN — APIXABAN 2.5 MG: 2.5 TABLET, FILM COATED ORAL at 09:47

## 2023-11-10 RX ADMIN — HYDROCODONE BITARTRATE AND ACETAMINOPHEN 2 TABLET: 5; 325 TABLET ORAL at 09:46

## 2023-11-10 RX ADMIN — FINASTERIDE 5 MG: 5 TABLET, FILM COATED ORAL at 09:47

## 2023-11-10 RX ADMIN — TAMSULOSIN HYDROCHLORIDE 0.4 MG: 0.4 CAPSULE ORAL at 09:47

## 2023-11-10 RX ADMIN — FOLIC ACID 1 MG: 1 TABLET ORAL at 09:47

## 2023-11-10 ASSESSMENT — PAIN DESCRIPTION - LOCATION: LOCATION: LEG

## 2023-11-10 ASSESSMENT — PAIN DESCRIPTION - ORIENTATION: ORIENTATION: RIGHT

## 2023-11-10 ASSESSMENT — PAIN SCALES - GENERAL
PAINLEVEL_OUTOF10: 7
PAINLEVEL_OUTOF10: 0

## 2023-11-10 NOTE — PLAN OF CARE
Problem: Discharge Planning  Goal: Discharge to home or other facility with appropriate resources  11/9/2023 2132 by Ziggy Garrett RN  Outcome: Progressing  Flowsheets (Taken 11/9/2023 2132)  Discharge to home or other facility with appropriate resources:   Identify barriers to discharge with patient and caregiver   Arrange for needed discharge resources and transportation as appropriate   Identify discharge learning needs (meds, wound care, etc)     Problem: Pain  Goal: Verbalizes/displays adequate comfort level or baseline comfort level  11/9/2023 2132 by Ziggy Garrett RN  Outcome: Progressing  Flowsheets (Taken 11/9/2023 2132)  Verbalizes/displays adequate comfort level or baseline comfort level:   Encourage patient to monitor pain and request assistance   Assess pain using appropriate pain scale   Administer analgesics based on type and severity of pain and evaluate response   Implement non-pharmacological measures as appropriate and evaluate response     Problem: Safety - Adult  Goal: Free from fall injury  11/9/2023 2132 by Ziggy Garrett RN  Outcome: Progressing  Flowsheets (Taken 11/9/2023 2132)  Free From Fall Injury: Instruct family/caregiver on patient safety     Problem: ABCDS Injury Assessment  Goal: Absence of physical injury  11/9/2023 2132 by Ziggy Garrett RN  Outcome: Progressing  Flowsheets (Taken 11/9/2023 2132)  Absence of Physical Injury: Implement safety measures based on patient assessment     Problem: Chronic Conditions and Co-morbidities  Goal: Patient's chronic conditions and co-morbidity symptoms are monitored and maintained or improved  11/9/2023 2132 by Ziggy Garrett RN  Outcome: Progressing  Flowsheets (Taken 11/9/2023 2132)  Care Plan - Patient's Chronic Conditions and Co-Morbidity Symptoms are Monitored and Maintained or Improved:   Monitor and assess patient's chronic conditions and comorbid symptoms for stability, deterioration, or improvement   Collaborate with multidisciplinary team to address chronic and comorbid conditions and prevent exacerbation or deterioration     Problem: Skin/Tissue Integrity - Adult  Goal: Skin integrity remains intact  11/9/2023 2132 by Sonia Arshad RN  Outcome: Progressing  Flowsheets (Taken 11/9/2023 2132)  Skin Integrity Remains Intact: Monitor for areas of redness and/or skin breakdown     Problem: Musculoskeletal - Adult  Goal: Return mobility to safest level of function  11/9/2023 2132 by Sonia Arshad RN  Outcome: Progressing  Flowsheets (Taken 11/9/2023 2132)  Return Mobility to Safest Level of Function:   Assess patient stability and activity tolerance for standing, transferring and ambulating with or without assistive devices   Assist with transfers and ambulation using safe patient handling equipment as needed   Ensure adequate protection for wounds/incisions during mobilization     Problem: Gastrointestinal - Adult  Goal: Maintains or returns to baseline bowel function  11/9/2023 2132 by Sonia Arshad RN  Outcome: Progressing  Flowsheets (Taken 11/9/2023 2132)  Maintains or returns to baseline bowel function:   Assess bowel function   Encourage oral fluids to ensure adequate hydration     Problem: Infection - Adult  Goal: Absence of infection at discharge  11/9/2023 2132 by Sonia Arshad RN  Outcome: Progressing  Flowsheets (Taken 11/9/2023 2132)  Absence of infection at discharge:   Assess and monitor for signs and symptoms of infection   Monitor lab/diagnostic results   Monitor all insertion sites i.e., indwelling lines, tubes and drains     Problem: Hematologic - Adult  Goal: Maintains hematologic stability  11/9/2023 2132 by Sonia Arshad RN  Outcome: Progressing  Flowsheets (Taken 11/9/2023 2132)  Maintains hematologic stability:   Assess for signs and symptoms of bleeding or hemorrhage   Monitor labs for bleeding or clotting disorders     Problem: Skin/Tissue Integrity  Goal: Absence of new skin <<-----Click here for Discharge Medication Review

## 2023-11-10 NOTE — CARE COORDINATION
11/10/23, 7:43 AM EST    DISCHARGE PLANNING EVALUATION    [facility] today, transport at 10:00    11/10/23, 7:43 AM EST    Patient goals/plan/ treatment preferences discussed by  and . Patient goals/plan/ treatment preferences reviewed with patient/ family. Patient/ family verbalize understanding of discharge plan and are in agreement with goal/plan/treatment preferences. Understanding was demonstrated using the teach back method. AVS provided by RN at time of discharge, which includes all necessary medical information pertaining to the patients current course of illness, treatment, post-discharge goals of care, and treatment preferences.      Services At/After Discharge: [address] (SNF) and In ambulance       IMM Letter  IMM Letter given to Patient/Family/Significant other/Guardian/POA/by[de-identified] Russ Barnard RN CM  IMM Letter date given[de-identified] 11/10/23  IMM Letter time given[de-identified] [id number]

## 2023-11-10 NOTE — CARE COORDINATION
(Late entry)  [phone] Pt verbalized understanding and gave permission for possible discharge within 4 hours of receiving IMM.

## 2023-11-10 NOTE — PROGRESS NOTES
Patient transported via stretcher to ambulance with all personal belongings included but not limited to written rx for colace, norco, and eliquis.

## (undated) DEVICE — ADHESIVE SKIN CLOSURE WND 8.661X1.5 IN 22 CM LIQUIBAND SECUR

## (undated) DEVICE — NEEDLE SPNL L3.5IN PNK HUB S STL REG WALL FIT STYL W/ QNCKE

## (undated) DEVICE — 450 ML BOTTLE OF 0.05% CHLORHEXIDINE GLUCONATE IN 99.95% STERILE WATER FOR IRRIGATION, USP AND APPLICATOR.: Brand: IRRISEPT ANTIMICROBIAL WOUND LAVAGE

## (undated) DEVICE — GLOVE ORANGE PI 8 1/2   MSG9085

## (undated) DEVICE — GUIDE PIN 3.2MM X 343MM: Brand: TRIGEN

## (undated) DEVICE — ROYAL SILK SURGICAL GOWN, XXL: Brand: CONVERTORS

## (undated) DEVICE — HIP PINNING: Brand: MEDLINE INDUSTRIES, INC.

## (undated) DEVICE — 4.0MM SHORT PILOT DRILL WITH AO CONNECTOR: Brand: TRIGEN

## (undated) DEVICE — SYRINGE MED 50ML LUERLOCK TIP

## (undated) DEVICE — SUTURE VCRL + SZ 2-0 L27IN ABSRB VLT L26MM CT-2 1/2 CIR VCP333H

## (undated) DEVICE — CONTAINER,SPECIMEN,PNEU TUBE,4OZ,OR STRL: Brand: MEDLINE

## (undated) DEVICE — 3.0MM X 1000MM BALL TIP GUIDE ROD: Brand: TRIGEN

## (undated) DEVICE — BAG,BANDED,W/RUBBERBAND,STERILE,30X36: Brand: MEDLINE

## (undated) DEVICE — COVER,MAYO STAND,STERILE: Brand: MEDLINE